# Patient Record
Sex: FEMALE | Race: WHITE | NOT HISPANIC OR LATINO | Employment: FULL TIME | URBAN - METROPOLITAN AREA
[De-identification: names, ages, dates, MRNs, and addresses within clinical notes are randomized per-mention and may not be internally consistent; named-entity substitution may affect disease eponyms.]

---

## 2017-05-05 ENCOUNTER — HOSPITAL ENCOUNTER (OUTPATIENT)
Dept: RADIOLOGY | Age: 22
Discharge: HOME | End: 2017-05-05
Payer: COMMERCIAL

## 2017-05-05 DIAGNOSIS — N83.209 OVARIAN CYST: ICD-10-CM

## 2017-05-05 PROCEDURE — 76830 TRANSVAGINAL US NON-OB: CPT

## 2017-05-05 PROCEDURE — 76856 US EXAM PELVIC COMPLETE: CPT

## 2017-05-09 ENCOUNTER — GENERIC CONVERSION - ENCOUNTER (OUTPATIENT)
Dept: OTHER | Facility: OTHER | Age: 22
End: 2017-05-09

## 2017-07-26 ENCOUNTER — APPOINTMENT (EMERGENCY)
Dept: ULTRASOUND IMAGING | Facility: HOSPITAL | Age: 22
End: 2017-07-26
Payer: COMMERCIAL

## 2017-07-26 ENCOUNTER — HOSPITAL ENCOUNTER (EMERGENCY)
Facility: HOSPITAL | Age: 22
Discharge: HOME | End: 2017-07-26
Attending: EMERGENCY MEDICINE | Admitting: EMERGENCY MEDICINE
Payer: COMMERCIAL

## 2017-07-26 VITALS
HEART RATE: 81 BPM | DIASTOLIC BLOOD PRESSURE: 64 MMHG | TEMPERATURE: 97.8 F | OXYGEN SATURATION: 98 % | WEIGHT: 139 LBS | SYSTOLIC BLOOD PRESSURE: 104 MMHG | RESPIRATION RATE: 18 BRPM

## 2017-07-26 DIAGNOSIS — R10.9 ABDOMINAL PAIN: Primary | ICD-10-CM

## 2017-07-26 LAB
ALBUMIN SERPL BCP-MCNC: 3.3 G/DL (ref 3.5–5)
ALP SERPL-CCNC: 73 U/L (ref 46–116)
ALT SERPL W P-5'-P-CCNC: 16 U/L (ref 12–78)
ANION GAP SERPL CALCULATED.3IONS-SCNC: 8 MMOL/L (ref 4–13)
AST SERPL W P-5'-P-CCNC: 14 U/L (ref 5–45)
BASOPHILS # BLD AUTO: 0.02 THOUSANDS/ΜL (ref 0–0.1)
BASOPHILS NFR BLD AUTO: 0 % (ref 0–1)
BILIRUB SERPL-MCNC: 0.3 MG/DL (ref 0.2–1)
BUN SERPL-MCNC: 11 MG/DL (ref 5–25)
CALCIUM SERPL-MCNC: 9.9 MG/DL (ref 8.3–10.1)
CHLORIDE SERPL-SCNC: 104 MMOL/L (ref 100–108)
CO2 SERPL-SCNC: 25 MMOL/L (ref 21–32)
CREAT SERPL-MCNC: 0.72 MG/DL (ref 0.6–1.3)
EOSINOPHIL # BLD AUTO: 0.1 THOUSAND/ΜL (ref 0–0.61)
EOSINOPHIL NFR BLD AUTO: 1 % (ref 0–6)
ERYTHROCYTE [DISTWIDTH] IN BLOOD BY AUTOMATED COUNT: 13.7 % (ref 11.6–15.1)
GFR SERPL CREATININE-BSD FRML MDRD: 121 ML/MIN/1.73SQ M
GLUCOSE SERPL-MCNC: 81 MG/DL (ref 65–140)
HCG UR QL: NEGATIVE
HCT VFR BLD AUTO: 43.1 % (ref 34.8–46.1)
HGB BLD-MCNC: 14 G/DL (ref 11.5–15.4)
LIPASE SERPL-CCNC: 177 U/L (ref 73–393)
LYMPHOCYTES # BLD AUTO: 2.69 THOUSANDS/ΜL (ref 0.6–4.47)
LYMPHOCYTES NFR BLD AUTO: 33 % (ref 14–44)
MCH RBC QN AUTO: 28.7 PG (ref 26.8–34.3)
MCHC RBC AUTO-ENTMCNC: 32.5 G/DL (ref 31.4–37.4)
MCV RBC AUTO: 89 FL (ref 82–98)
MONOCYTES # BLD AUTO: 0.54 THOUSAND/ΜL (ref 0.17–1.22)
MONOCYTES NFR BLD AUTO: 7 % (ref 4–12)
NEUTROPHILS # BLD AUTO: 4.83 THOUSANDS/ΜL (ref 1.85–7.62)
NEUTS SEG NFR BLD AUTO: 59 % (ref 43–75)
PLATELET # BLD AUTO: 327 THOUSANDS/UL (ref 149–390)
PMV BLD AUTO: 9.5 FL (ref 8.9–12.7)
POTASSIUM SERPL-SCNC: 4 MMOL/L (ref 3.5–5.3)
PROT SERPL-MCNC: 7.7 G/DL (ref 6.4–8.2)
RBC # BLD AUTO: 4.87 MILLION/UL (ref 3.81–5.12)
SODIUM SERPL-SCNC: 137 MMOL/L (ref 136–145)
WBC # BLD AUTO: 8.18 THOUSAND/UL (ref 4.31–10.16)

## 2017-07-26 PROCEDURE — 81025 URINE PREGNANCY TEST: CPT | Performed by: EMERGENCY MEDICINE

## 2017-07-26 PROCEDURE — 80053 COMPREHEN METABOLIC PANEL: CPT | Performed by: EMERGENCY MEDICINE

## 2017-07-26 PROCEDURE — 36415 COLL VENOUS BLD VENIPUNCTURE: CPT | Performed by: EMERGENCY MEDICINE

## 2017-07-26 PROCEDURE — 85025 COMPLETE CBC W/AUTO DIFF WBC: CPT | Performed by: EMERGENCY MEDICINE

## 2017-07-26 PROCEDURE — 83690 ASSAY OF LIPASE: CPT | Performed by: EMERGENCY MEDICINE

## 2017-07-26 PROCEDURE — 96374 THER/PROPH/DIAG INJ IV PUSH: CPT

## 2017-07-26 PROCEDURE — 76705 ECHO EXAM OF ABDOMEN: CPT

## 2017-07-26 PROCEDURE — 99284 EMERGENCY DEPT VISIT MOD MDM: CPT

## 2017-07-26 PROCEDURE — 96361 HYDRATE IV INFUSION ADD-ON: CPT

## 2017-07-26 PROCEDURE — 96375 TX/PRO/DX INJ NEW DRUG ADDON: CPT

## 2017-07-26 RX ORDER — ONDANSETRON 2 MG/ML
4 INJECTION INTRAMUSCULAR; INTRAVENOUS ONCE
Status: COMPLETED | OUTPATIENT
Start: 2017-07-26 | End: 2017-07-26

## 2017-07-26 RX ORDER — KETOROLAC TROMETHAMINE 30 MG/ML
15 INJECTION, SOLUTION INTRAMUSCULAR; INTRAVENOUS ONCE
Status: COMPLETED | OUTPATIENT
Start: 2017-07-26 | End: 2017-07-26

## 2017-07-26 RX ADMIN — FAMOTIDINE 20 MG: 10 INJECTION, SOLUTION INTRAVENOUS at 06:58

## 2017-07-26 RX ADMIN — ONDANSETRON 4 MG: 2 INJECTION INTRAMUSCULAR; INTRAVENOUS at 06:58

## 2017-07-26 RX ADMIN — SODIUM CHLORIDE 1000 ML: 0.9 INJECTION, SOLUTION INTRAVENOUS at 06:58

## 2017-07-26 RX ADMIN — KETOROLAC TROMETHAMINE 15 MG: 30 INJECTION, SOLUTION INTRAMUSCULAR at 06:58

## 2017-09-20 ENCOUNTER — GENERIC CONVERSION - ENCOUNTER (OUTPATIENT)
Dept: OTHER | Facility: OTHER | Age: 22
End: 2017-09-20

## 2017-09-20 PROCEDURE — G0145 SCR C/V CYTO,THINLAYER,RESCR: HCPCS | Performed by: NURSE PRACTITIONER

## 2017-09-21 ENCOUNTER — LAB REQUISITION (OUTPATIENT)
Dept: LAB | Facility: HOSPITAL | Age: 22
End: 2017-09-21
Payer: COMMERCIAL

## 2017-09-21 DIAGNOSIS — Z01.419 ENCOUNTER FOR GYNECOLOGICAL EXAMINATION WITHOUT ABNORMAL FINDING: ICD-10-CM

## 2017-09-21 DIAGNOSIS — Z11.3 ENCOUNTER FOR SCREENING FOR INFECTIONS WITH PREDOMINANTLY SEXUAL MODE OF TRANSMISSION: ICD-10-CM

## 2017-09-21 PROCEDURE — 87491 CHLMYD TRACH DNA AMP PROBE: CPT | Performed by: NURSE PRACTITIONER

## 2017-09-21 PROCEDURE — 87591 N.GONORRHOEAE DNA AMP PROB: CPT | Performed by: NURSE PRACTITIONER

## 2017-09-25 LAB
CHLAMYDIA DNA CVX QL NAA+PROBE: NORMAL
N GONORRHOEA DNA GENITAL QL NAA+PROBE: NORMAL

## 2017-10-05 LAB
LAB AP GYN PRIMARY INTERPRETATION: NORMAL
Lab: NORMAL

## 2018-01-17 NOTE — RESULT NOTES
Verified Results  * US PELVIS COMPLETE [facility] [place] AND TRANSVAGINAL) [id number] 10:22AM Savanna Kurtz Order Number: [id number]     Test Name Result Flag Reference   US PELVIS COMPLETE (TRANSABDOMINAL AND TRANSVAGINAL) (Report)     PELVIC ULTRASOUND, COMPLETE     INDICATION: Ovarian cyst           COMPARISON: None  TECHNIQUE:  Transabdominal pelvic ultrasound was performed in sagittal and transverse planes with a curvilinear transducer  Additional transvaginal imaging was performed to better evaluate the endometrium and ovaries  Imaging included volumetric    sweeps as well as traditional still imaging technique  FINDINGS:     UTERUS:   The uterus is anteverted in position, measuring 8 3 x 3 1 x 4 3 cm  Contour and echotexture appear normal      The cervix shows no suspicious abnormality  ENDOMETRIUM:    Normal caliber of 4 mm  Homogenous and normal in appearance  OVARIES/ADNEXA:   Right ovary: 2 4 x 2 0 x 2 0 cm  There is a tiny ill-defined echogenic in the ovary measuring 0 7 x 0 6 x 0 7 cm, possibly calcification  Doppler flow within normal limits  Left ovary: 4 8 x 2 3 x 3 7 cm  Large, well-circumscribed predominantly hyperechoic heterogeneous mass within the ovary measures 3 8 x 2 1 x 3 4 cm, with no intrinsic vascularity  A tiny calcification is noted within the mass  Findings may represent a dermoid cyst    Doppler flow within normal limits  No suspicious adnexal mass or loculated collections  There is no free fluid  IMPRESSION:      Complex 3 8 cm left ovarian mass, possibly a dermoid  Suggest MRI for further evaluation  Indeterminate subcentimeter hyperechoic focus in the right ovary  Follow-up in 6-12 weeks suggested to ensure stability or resolution            ##sigslh##sigslh            Workstation performed: [id number]     Signed by:   Naman Resendiz MD   5/8/17

## 2018-01-22 VITALS
WEIGHT: 136.19 LBS | DIASTOLIC BLOOD PRESSURE: 60 MMHG | SYSTOLIC BLOOD PRESSURE: 100 MMHG | BODY MASS INDEX: 24.67 KG/M2

## 2018-01-22 VITALS
SYSTOLIC BLOOD PRESSURE: 100 MMHG | DIASTOLIC BLOOD PRESSURE: 60 MMHG | WEIGHT: 136.19 LBS | BODY MASS INDEX: 24.67 KG/M2

## 2018-03-14 RX ORDER — DROSPIRENONE, ETHINYL ESTRADIOL AND LEVOMEFOLATE CALCIUM AND LEVOMEFOLATE CALCIUM 3-0.02(24)
KIT ORAL
Qty: 84 TABLET | Refills: 0 | OUTPATIENT
Start: 2018-03-14

## 2018-03-19 DIAGNOSIS — Z30.41 ENCOUNTER FOR SURVEILLANCE OF CONTRACEPTIVE PILLS: Primary | ICD-10-CM

## 2018-03-19 RX ORDER — DROSPIRENONE, ETHINYL ESTRADIOL AND LEVOMEFOLATE CALCIUM AND LEVOMEFOLATE CALCIUM 3-0.02(24)
1 KIT ORAL DAILY
Qty: 90 TABLET | Refills: 2 | Status: SHIPPED | OUTPATIENT
Start: 2018-03-19 | End: 2018-10-19 | Stop reason: SDUPTHER

## 2018-03-19 RX ORDER — DROSPIRENONE, ETHINYL ESTRADIOL AND LEVOMEFOLATE CALCIUM AND LEVOMEFOLATE CALCIUM 3-0.02(24)
1 KIT ORAL DAILY
COMMUNITY
Start: 2014-07-21 | End: 2018-03-19 | Stop reason: SDUPTHER

## 2018-03-19 NOTE — TELEPHONE ENCOUNTER
The patient called last Wednesday and she has now been without pills for about 3-4 days because the script was not filled

## 2018-10-19 DIAGNOSIS — Z30.41 ENCOUNTER FOR SURVEILLANCE OF CONTRACEPTIVE PILLS: ICD-10-CM

## 2018-10-19 RX ORDER — DROSPIRENONE, ETHINYL ESTRADIOL AND LEVOMEFOLATE CALCIUM AND LEVOMEFOLATE CALCIUM 3-0.02(24)
1 KIT ORAL DAILY
Qty: 28 TABLET | Refills: 0 | Status: SHIPPED | OUTPATIENT
Start: 2018-10-19 | End: 2018-10-23 | Stop reason: SDUPTHER

## 2018-10-23 ENCOUNTER — ANNUAL EXAM (OUTPATIENT)
Dept: OBGYN CLINIC | Facility: MEDICAL CENTER | Age: 23
End: 2018-10-23
Payer: COMMERCIAL

## 2018-10-23 VITALS
WEIGHT: 137 LBS | SYSTOLIC BLOOD PRESSURE: 110 MMHG | DIASTOLIC BLOOD PRESSURE: 80 MMHG | HEIGHT: 63 IN | BODY MASS INDEX: 24.27 KG/M2

## 2018-10-23 DIAGNOSIS — Z01.419 ENCNTR FOR GYN EXAM (GENERAL) (ROUTINE) W/O ABN FINDINGS: Primary | ICD-10-CM

## 2018-10-23 DIAGNOSIS — N83.8 OVARIAN MASS, LEFT: ICD-10-CM

## 2018-10-23 DIAGNOSIS — Z30.41 ENCOUNTER FOR SURVEILLANCE OF CONTRACEPTIVE PILLS: ICD-10-CM

## 2018-10-23 PROCEDURE — S0612 ANNUAL GYNECOLOGICAL EXAMINA: HCPCS | Performed by: NURSE PRACTITIONER

## 2018-10-23 RX ORDER — DROSPIRENONE, ETHINYL ESTRADIOL AND LEVOMEFOLATE CALCIUM AND LEVOMEFOLATE CALCIUM 3-0.02(24)
1 KIT ORAL DAILY
Qty: 84 TABLET | Refills: 3 | Status: SHIPPED | OUTPATIENT
Start: 2018-10-23 | End: 2019-11-07 | Stop reason: SDUPTHER

## 2018-10-23 NOTE — PROGRESS NOTES
ASSESSMENT & PLAN: Lyndsay Peralta is a 26 y o  Jael Sarahy with normal gynecologic exam     1   Routine well woman exam done today  2  Pap and HPV:  The patient's last pap was 2017  It was normal     Pap was not done today  Current ASCCP Guidelines reviewed  3   STD testing  was not done   4  Gardasil recommendations reviewed  is vaccinated  5  The following were reviewed in today's visit: breast self exam, use and side effects of OCPs, adequate intake of calcium and vitamin D, exercise and healthy diet  6  Sent for f/u us to check stability of possible dermoid cyst 2016  CC:  Annual Gynecologic Examination    HPI: Lyndsay Peralta is a 26 y o  Jael Sarahy who presents for annual gynecologic examination  In monog relationship for 2 years  Neg g/c last year,declines repeat  She has the following concerns: requests repeat us to check ovary, asymtomatic  Health Maintenance:    She wears her seatbelt routinely  She does not perform regular monthly self breast exams  She feels safe at home  Past Medical History:   Diagnosis Date    Ovarian cyst        History reviewed  No pertinent surgical history  OB/Gyn History:    Pt does not have menstrual issues  History of sexually transmitted infection: No   History of abnormal pap smears: No      Patient is currently sexually active  The current method of family planning is OCP (estrogen/progesterone)  OB History      Para Term  AB Living    0 0 0 0 0 0    SAB TAB Ectopic Multiple Live Births    0 0 0 0 0          Family History   Problem Relation Age of Onset    No Known Problems Mother     No Known Problems Father        Social History:  Social History     Social History    Marital status: Single     Spouse name: N/A    Number of children: N/A    Years of education: N/A     Occupational History    Not on file       Social History Main Topics    Smoking status: Never Smoker    Smokeless tobacco: Never Used   Mavis Cousin Alcohol use Yes      Comment: social    Drug use: No    Sexual activity: Yes     Partners: Male     Birth control/ protection: Pill     Other Topics Concern    Not on file     Social History Narrative    No narrative on file     Patient is single  Patient is currently employed , has biology degree, working in [employer]  Allergies   Allergen Reactions    Amoxicillin GI Intolerance         Current Outpatient Prescriptions:     Drospiren-Eth Estrad-Levomefol 3-0 02-0 451 MG TABS, Take 1 tablet by mouth daily, Disp: 84 tablet, Rfl: 3    Review of Systems:  Constitutional :no fever, feels well, no tiredness, no recent weight gain or loss  ENT: no ear ache, no loss of hearing, no nosebleeds or nasal discharge, no sore throat or hoarseness  Cardiovascular: no complaints of slow or fast heart beat, no chest pain, no palpitations, no leg claudication or lower extremity edema  Respiratory: no complaints of shortness of shortness of breath, no RAMOS  Breasts:no complaints of breast pain, breast lump, or nipple discharge  Gastrointestinal: no complaints of abdominal pain, constipation, nausea, vomiting, or diarrhea or bloody stools  Genitourinary : no complaints of dysuria, incontinence, pelvic pain, no dysmenorrhea, vaginal discharge or abnormal vaginal bleeding and as noted in HPI  Musculoskeletal: no complaints of arthralgia, no myalgia, no joint swelling or stiffness, no limb pain or swelling    Integumentary: no complaints of skin rash or lesion, itching or dry skin  Neurological: no complaints of headache, no confusion, no numbness or tingling, no dizziness or fainting    Objective      /80   Ht 5' 2 5" (1 588 m)   Wt 62 1 kg (137 lb)   LMP 09/25/2018   BMI 24 66 kg/m²     General:   appears stated age, cooperative, alert normal mood and affect   Neck: normal, supple,trachea midline, no masses   Heart: regular rate and rhythm, S1, S2 normal, no murmur, click, rub or gallop   Lungs: clear to auscultation bilaterally   Breasts: normal appearance, no masses or tenderness   Abdomen: soft, non-tender, without masses or organomegaly   Vulva: normal female genitalia   Vagina: normal vagina   Urethra: normal   Cervix: Normal, no discharge  Uterus: normal size, contour, position, consistency, mobility, non-tender   Adnexa: normal adnexa   Lymphatic palpation of lymph nodes in neck, axilla, groin and/or other locations: no lymphadenopathy or masses noted   Skin normal skin turgor and no rashes     Psychiatric orientation to person, place, and time: normal  mood and affect: normal

## 2019-01-02 ENCOUNTER — APPOINTMENT (EMERGENCY)
Dept: ULTRASOUND IMAGING | Facility: HOSPITAL | Age: 24
End: 2019-01-02
Payer: COMMERCIAL

## 2019-01-02 ENCOUNTER — APPOINTMENT (EMERGENCY)
Dept: RADIOLOGY | Facility: HOSPITAL | Age: 24
End: 2019-01-02
Payer: COMMERCIAL

## 2019-01-02 ENCOUNTER — HOSPITAL ENCOUNTER (EMERGENCY)
Facility: HOSPITAL | Age: 24
Discharge: HOME | End: 2019-01-02
Attending: EMERGENCY MEDICINE | Admitting: EMERGENCY MEDICINE
Payer: COMMERCIAL

## 2019-01-02 VITALS
OXYGEN SATURATION: 97 % | BODY MASS INDEX: 24.6 KG/M2 | WEIGHT: 136.69 LBS | SYSTOLIC BLOOD PRESSURE: 108 MMHG | HEART RATE: 76 BPM | DIASTOLIC BLOOD PRESSURE: 71 MMHG | TEMPERATURE: 98.1 F | RESPIRATION RATE: 14 BRPM

## 2019-01-02 DIAGNOSIS — B34.9 VIRAL SYNDROME: ICD-10-CM

## 2019-01-02 DIAGNOSIS — R74.01 TRANSAMINITIS: ICD-10-CM

## 2019-01-02 DIAGNOSIS — E86.9 VOLUME DEPLETION: ICD-10-CM

## 2019-01-02 DIAGNOSIS — K59.00 CONSTIPATION: Primary | ICD-10-CM

## 2019-01-02 LAB
ALBUMIN SERPL BCP-MCNC: 3.1 G/DL (ref 3.5–5)
ALP SERPL-CCNC: 372 U/L (ref 46–116)
ALT SERPL W P-5'-P-CCNC: 258 U/L (ref 12–78)
ANION GAP SERPL CALCULATED.3IONS-SCNC: 9 MMOL/L (ref 4–13)
ANISOCYTOSIS BLD QL SMEAR: PRESENT
AST SERPL W P-5'-P-CCNC: 336 U/L (ref 5–45)
BACTERIA UR QL AUTO: ABNORMAL /HPF
BASOPHILS # BLD MANUAL: 0 THOUSAND/UL (ref 0–0.1)
BASOPHILS NFR MAR MANUAL: 0 % (ref 0–1)
BILIRUB SERPL-MCNC: 1 MG/DL (ref 0.2–1)
BILIRUB UR QL STRIP: ABNORMAL
BUN SERPL-MCNC: 8 MG/DL (ref 5–25)
CALCIUM SERPL-MCNC: 9.2 MG/DL (ref 8.3–10.1)
CAOX CRY URNS QL MICRO: ABNORMAL /HPF
CHLORIDE SERPL-SCNC: 105 MMOL/L (ref 100–108)
CLARITY UR: CLEAR
CO2 SERPL-SCNC: 26 MMOL/L (ref 21–32)
COLOR UR: ABNORMAL
CREAT SERPL-MCNC: 0.89 MG/DL (ref 0.6–1.3)
EOSINOPHIL # BLD MANUAL: 0 THOUSAND/UL (ref 0–0.4)
EOSINOPHIL NFR BLD MANUAL: 0 % (ref 0–6)
ERYTHROCYTE [DISTWIDTH] IN BLOOD BY AUTOMATED COUNT: 13.7 % (ref 11.6–15.1)
EXT PREG TEST URINE: NEGATIVE
FLUAV AG SPEC QL IA: NEGATIVE
FLUAV AG SPEC QL: NORMAL
FLUBV AG SPEC QL IA: NEGATIVE
FLUBV AG SPEC QL: NORMAL
GFR SERPL CREATININE-BSD FRML MDRD: 92 ML/MIN/1.73SQ M
GIANT PLATELETS BLD QL SMEAR: PRESENT
GLUCOSE SERPL-MCNC: 91 MG/DL (ref 65–140)
GLUCOSE UR STRIP-MCNC: NEGATIVE MG/DL
HCT VFR BLD AUTO: 43.3 % (ref 34.8–46.1)
HETEROPH AB SER QL: POSITIVE
HGB BLD-MCNC: 13.8 G/DL (ref 11.5–15.4)
HGB UR QL STRIP.AUTO: NEGATIVE
KETONES UR STRIP-MCNC: ABNORMAL MG/DL
LEUKOCYTE ESTERASE UR QL STRIP: ABNORMAL
LG PLATELETS BLD QL SMEAR: PRESENT
LYMPHOCYTES # BLD AUTO: 11.42 THOUSAND/UL (ref 0.6–4.47)
LYMPHOCYTES # BLD AUTO: 73 % (ref 14–44)
MCH RBC QN AUTO: 28.8 PG (ref 26.8–34.3)
MCHC RBC AUTO-ENTMCNC: 31.9 G/DL (ref 31.4–37.4)
MCV RBC AUTO: 90 FL (ref 82–98)
MONOCYTES # BLD AUTO: 1.57 THOUSAND/UL (ref 0–1.22)
MONOCYTES NFR BLD: 10 % (ref 4–12)
MUCOUS THREADS UR QL AUTO: ABNORMAL
NEUTROPHILS # BLD MANUAL: 1.41 THOUSAND/UL (ref 1.85–7.62)
NEUTS SEG NFR BLD AUTO: 9 % (ref 43–75)
NITRITE UR QL STRIP: NEGATIVE
NON-SQ EPI CELLS URNS QL MICRO: ABNORMAL /HPF
NRBC BLD AUTO-RTO: 0 /100 WBCS
PH UR STRIP.AUTO: 6 [PH] (ref 4.5–8)
PLATELET # BLD AUTO: 156 THOUSANDS/UL (ref 149–390)
PLATELET BLD QL SMEAR: ADEQUATE
PMV BLD AUTO: 9.2 FL (ref 8.9–12.7)
POTASSIUM SERPL-SCNC: 4.2 MMOL/L (ref 3.5–5.3)
PROT SERPL-MCNC: 7.3 G/DL (ref 6.4–8.2)
PROT UR STRIP-MCNC: NEGATIVE MG/DL
RBC # BLD AUTO: 4.79 MILLION/UL (ref 3.81–5.12)
RBC #/AREA URNS AUTO: ABNORMAL /HPF
RSV B RNA SPEC QL NAA+PROBE: NORMAL
SODIUM SERPL-SCNC: 140 MMOL/L (ref 136–145)
SP GR UR STRIP.AUTO: >=1.03 (ref 1–1.03)
TOTAL CELLS COUNTED SPEC: 100
UROBILINOGEN UR QL STRIP.AUTO: 1 E.U./DL
VARIANT LYMPHS # BLD AUTO: 8 %
WBC # BLD AUTO: 15.65 THOUSAND/UL (ref 4.31–10.16)
WBC #/AREA URNS AUTO: ABNORMAL /HPF

## 2019-01-02 PROCEDURE — 36415 COLL VENOUS BLD VENIPUNCTURE: CPT | Performed by: EMERGENCY MEDICINE

## 2019-01-02 PROCEDURE — 81001 URINALYSIS AUTO W/SCOPE: CPT | Performed by: EMERGENCY MEDICINE

## 2019-01-02 PROCEDURE — 74022 RADEX COMPL AQT ABD SERIES: CPT

## 2019-01-02 PROCEDURE — 81025 URINE PREGNANCY TEST: CPT | Performed by: EMERGENCY MEDICINE

## 2019-01-02 PROCEDURE — 87631 RESP VIRUS 3-5 TARGETS: CPT | Performed by: EMERGENCY MEDICINE

## 2019-01-02 PROCEDURE — 76705 ECHO EXAM OF ABDOMEN: CPT

## 2019-01-02 PROCEDURE — 99284 EMERGENCY DEPT VISIT MOD MDM: CPT

## 2019-01-02 PROCEDURE — 86308 HETEROPHILE ANTIBODY SCREEN: CPT | Performed by: EMERGENCY MEDICINE

## 2019-01-02 PROCEDURE — 85027 COMPLETE CBC AUTOMATED: CPT | Performed by: EMERGENCY MEDICINE

## 2019-01-02 PROCEDURE — 80053 COMPREHEN METABOLIC PANEL: CPT | Performed by: EMERGENCY MEDICINE

## 2019-01-02 PROCEDURE — 85007 BL SMEAR W/DIFF WBC COUNT: CPT | Performed by: EMERGENCY MEDICINE

## 2019-01-02 RX ORDER — POLYETHYLENE GLYCOL 3350 17 G/17G
17 POWDER, FOR SOLUTION ORAL DAILY
Qty: 51 G | Refills: 0 | Status: SHIPPED | OUTPATIENT
Start: 2019-01-02 | End: 2019-04-18 | Stop reason: ALTCHOICE

## 2019-01-02 NOTE — ED PROVIDER NOTES
History  Chief Complaint   Patient presents with    Abdominal Pain     Pt states that she has been feeling bloated and having abdominal pain for 5 days  Pt states that she also has a lump in her right groin area  Patient is a 56-year-old female with no past medical history presents with abdominal pain and bloating  Patient states that pain started a few days ago and has been progressively worse  She describes the pain as generalized discomfort with a feeling of bloating  She describes nausea and 1 episode of vomiting yesterday  She has had decreased appetite  She denies diarrhea but admits to constipation  She has had 1 bowel movement in the last 5 days  She took Dulcolax prior to this bowel movement on Saturday but has not moved her bowels since then  She denies any vaginal bleeding or discharge and her last menstrual period was 2 weeks ago  History provided by:  Patient  Abdominal Pain   Pain location:  Generalized  Pain quality: aching and bloating    Pain radiates to:  Does not radiate  Pain severity:  Mild  Duration:  5 days  Progression:  Unchanged  Associated symptoms: constipation    Associated symptoms: no chest pain, no chills, no cough, no diarrhea, no dysuria, no fever, no hematochezia, no hematuria, no nausea, no shortness of breath, no sore throat, no vaginal bleeding, no vaginal discharge and no vomiting        Prior to Admission Medications   Prescriptions Last Dose Informant Patient Reported? Taking? Drospiren-Eth Estrad-Levomefol 3-0 02-0 451 MG TABS   No No   Sig: Take 1 tablet by mouth daily      Facility-Administered Medications: None       Past Medical History:   Diagnosis Date    Ovarian cyst        History reviewed  No pertinent surgical history  Family History   Problem Relation Age of Onset    No Known Problems Mother     No Known Problems Father      I have reviewed and agree with the history as documented      Social History   Substance Use Topics    Smoking status: Never Smoker    Smokeless tobacco: Never Used    Alcohol use Yes      Comment: social - 1 times a week  Review of Systems   Constitutional: Negative for chills and fever  HENT: Negative for sore throat and trouble swallowing  Eyes: Negative for visual disturbance  Respiratory: Negative for cough and shortness of breath  Cardiovascular: Negative for chest pain, palpitations and leg swelling  Gastrointestinal: Positive for abdominal pain and constipation  Negative for diarrhea, hematochezia, nausea and vomiting  Genitourinary: Negative for dysuria, hematuria, vaginal bleeding and vaginal discharge  Musculoskeletal: Negative for neck pain and neck stiffness  Neurological: Negative for dizziness, weakness, numbness and headaches  Physical Exam  Physical Exam   Constitutional: She is oriented to person, place, and time  She appears well-developed and well-nourished  HENT:   Head: Atraumatic  Eyes: Pupils are equal, round, and reactive to light  EOM are normal    Neck: Normal range of motion  Neck supple  Cardiovascular: Normal rate, regular rhythm, normal heart sounds, intact distal pulses and normal pulses  Pulmonary/Chest: Effort normal and breath sounds normal  No respiratory distress  Abdominal: Soft  She exhibits no distension  There is generalized tenderness  There is no rigidity, no rebound and no guarding  Mild tenderness in the right inguinal region  No palpable hernia, lymphadenopathy  No overlying skin changes  Musculoskeletal: Normal range of motion  She exhibits no edema or tenderness  Neurological: She is alert and oriented to person, place, and time  She has normal strength  No cranial nerve deficit or sensory deficit  Skin: Skin is warm and dry  Psychiatric: She has a normal mood and affect         Vital Signs  ED Triage Vitals [01/02/19 0805]   Temperature Pulse Respirations Blood Pressure SpO2   98 1 °F (36 7 °C) 91 14 125/67 98 %      Temp Source Heart Rate Source Patient Position - Orthostatic VS BP Location FiO2 (%)   Oral Monitor Lying Right arm --      Pain Score       6           Vitals:    01/02/19 0805 01/02/19 1031   BP: 125/67 108/71   Pulse: 91 76   Patient Position - Orthostatic VS: Lying Sitting       Visual Acuity      ED Medications  Medications - No data to display    Diagnostic Studies  Results Reviewed     Procedure Component Value Units Date/Time    INFLUENZA A/B AND RSV, PCR [[id number]]  (Normal) Collected:  01/02/19 0912    Lab Status:  Final result Specimen:  Nasopharyngeal from Nasopharyngeal Swab Updated:  01/02/19 1533     INFLU A PCR None Detected     INFLU B PCR None Detected     RSV PCR None Detected    Mononucleosis screen [[id number]]  (Abnormal) Collected:  01/02/19 0930    Lab Status:  Final result Specimen:  Blood from Arm, Right Updated:  01/02/19 1505     Monotest Positive (A)    Urine Microscopic [[id number]]  (Abnormal) Collected:  01/02/19 1100    Lab Status:  Final result Specimen:  Urine from Urine, Clean Catch Updated:  01/02/19 1321     RBC, UA 0-1 (A) /hpf      WBC, UA 2-4 (A) /hpf      Epithelial Cells Occasional /hpf      Bacteria, UA Occasional /hpf      Ca Oxalate Leanne, UA Occasional (A) /hpf      MUCUS THREADS Occasional (A)    UA w Reflex to Microscopic [[id number]]  (Abnormal) Collected:  01/02/19 1100    Lab Status:  Final result Specimen:  Urine from Urine, Clean Catch Updated:  01/02/19 1119     Color, UA Liane     Clarity, UA Clear     Specific Gravity, UA >=1 030     pH, UA 6 0     Leukocytes, UA Trace (A)     Nitrite, UA Negative     Protein, UA Negative mg/dl      Glucose, UA Negative mg/dl      Ketones, UA Trace (A) mg/dl      Urobilinogen, UA 1 0 E U /dl      Bilirubin, UA Moderate (A)     Blood, UA Negative    POCT pregnancy, urine [[id number]]  (Normal) Resulted:  01/02/19 1104    Lab Status:  Final result Specimen:  Urine Updated:  01/02/19 1104     EXT PREG TEST UR (Ref: Negative) negative    Rapid Influenza Screen with Reflex PCR [[id number]]  (Normal) Collected:  01/02/19 0912    Lab Status:  Final result Specimen:  Nasopharyngeal from Nasopharyngeal Swab Updated:  01/02/19 0936     Rapid Influenza A Ag Negative     Rapid Influenza B Ag Negative    CBC and differential [[id number]]  (Abnormal) Collected:  01/02/19 0838    Lab Status:  Final result Specimen:  Blood from Arm, Right Updated:  01/02/19 0920     WBC 15 65 (H) Thousand/uL      RBC 4 79 Million/uL      Hemoglobin 13 8 g/dL      Hematocrit 43 3 %      MCV 90 fL      MCH 28 8 pg      MCHC 31 9 g/dL      RDW 13 7 %      MPV 9 2 fL      Platelets 479 Thousands/uL      nRBC 0 /100 WBCs     Narrative: This is an appended report  These results have been appended to a previously verified report  Comprehensive metabolic panel [[id number]]  (Abnormal) Collected:  01/02/19 0838    Lab Status:  Final result Specimen:  Blood from Arm, Right Updated:  01/02/19 0905     Sodium 140 mmol/L      Potassium 4 2 mmol/L      Chloride 105 mmol/L      CO2 26 mmol/L      ANION GAP 9 mmol/L      BUN 8 mg/dL      Creatinine 0 89 mg/dL      Glucose 91 mg/dL      Calcium 9 2 mg/dL       (H) U/L       (H) U/L      Alkaline Phosphatase 372 (H) U/L      Total Protein 7 3 g/dL      Albumin 3 1 (L) g/dL      Total Bilirubin 1 00 mg/dL      eGFR 92 ml/min/1 73sq m     Narrative:         National Kidney Disease Education Program recommendations are as follows:  GFR calculation is accurate only with a steady state creatinine  Chronic Kidney disease less than 60 ml/min/1 73 sq  meters  Kidney failure less than 15 ml/min/1 73 sq  meters  US gallbladder   Final Result by Lori Russo DO (01/02 1031)      Normal       Workstation performed: [id number]         XR abdomen obstruction series   Final Result by William Moses MD (01/02 9173)      Moderate amount of stool without evidence of mechanical obstruction        Indeterminate calcifications in the midline of the pelvic area  Workstation performed: [id number]                    Procedures  Procedures       Phone Contacts  ED Phone Contact    ED Course  ED Course as of Jan 02 2019 Wed Jan 02, 2019   0907 Elevated transaminases  Will check influenza screen and ultrasound right upper quadrant  1101 sending urine studies now  Spoke with lab who states that mononucleosis screen is run once a day and may not result today  I discussed this with patient  1200 Patient well appearing  Her urine appears concentrated  However she is able to tolerate po and agrees to continue po hydration at home  MDM  Number of Diagnoses or Management Options  Constipation: new and does not require workup  Transaminitis: new and requires workup  Viral syndrome: new and requires workup  Volume depletion: new and does not require workup  Diagnosis management comments: Patient presents with nausea, abdominal discomfort, bloating and constipation  X-ray shows no evidence of small-bowel obstruction but does show moderate stool burden  Her labs indicate volume depletion and elevated transaminases  She is negative for influenza  I added a mononucleosis screen due to atypical lymphocytes  This is pending at time of discharge  However patient is well-appearing and is stable for discharge home  She will continue p o  Hydration at home I recommended MiraLax for constipation  She will follow up with primary care    Will return to ED sooner if symptoms worsen or persist        Amount and/or Complexity of Data Reviewed  Clinical lab tests: ordered and reviewed  Tests in the radiology section of CPT®: ordered and reviewed  Tests in the medicine section of CPT®: ordered and reviewed  Review and summarize past medical records: yes  Independent visualization of images, tracings, or specimens: yes    Risk of Complications, Morbidity, and/or Mortality  Presenting problems: moderate  Diagnostic procedures: low  Management options: low    Patient Progress  Patient progress: stable    CritCare Time    Disposition  Final diagnoses:   Constipation   Viral syndrome   Transaminitis   Volume depletion     Time reflects when diagnosis was documented in both MDM as applicable and the Disposition within this note     Time User Action Codes Description Comment    1/2/2019 11:33 AM Franklyn Flakes L Add [K59 00] Constipation     1/2/2019 11:33 AM Wells Flakes L Add [B34 9] Viral syndrome     1/2/2019 11:33 AM Franklyn Flakes L Add [R74 0] Transaminitis     1/2/2019 11:34 AM Eladia Lopez Add [E86 9] Volume depletion       ED Disposition     ED Disposition Condition Comment    Discharge  [facility] discharge to home/self care  Condition at discharge: Stable        Follow-up Information     Follow up With Specialties Details Why Contact Leila May    [phone]            Discharge Medication List as of 1/2/2019 11:36 AM      CONTINUE these medications which have NOT CHANGED    Details   Drospiren-Eth Estrad-Levomefol 3-0 02-0 451 MG TABS Take 1 tablet by mouth daily, Starting Tue 10/23/2018, Normal           No discharge procedures on file      ED Provider  Electronically Signed by           Liudmila Anthony DO  01/02/19 2019

## 2019-01-02 NOTE — DISCHARGE INSTRUCTIONS
Constipation   WHAT YOU NEED TO KNOW:   Constipation is when you have hard, dry bowel movements, or you go longer than usual between bowel movements  DISCHARGE INSTRUCTIONS:   Return to the emergency department if:   · You have blood in your bowel movements  · You have a fever and abdominal pain with the constipation  Contact your healthcare provider if:   · Your constipation gets worse  · You start to vomit  · You have questions or concerns about your condition or care  Medicines:   · Medicine or a fiber supplement  may help make your bowel movement softer  A laxative may help relax and loosen your intestines to help you have a bowel movement  You may also be given medicine to increase fluid in your intestines  The fluid may help move bowel movements through your intestines  · Take your medicine as directed  Contact your healthcare provider if you think your medicine is not helping or if you have side effects  Tell him of her if you are allergic to any medicine  Keep a list of the medicines, vitamins, and herbs you take  Include the amounts, and when and why you take them  Bring the list or the pill bottles to follow-up visits  Carry your medicine list with you in case of an emergency  Manage your constipation:   · Drink liquids as directed  You may need to drink extra liquids to help soften and move your bowels  Ask how much liquid to drink each day and which liquids are best for you  · Eat high-fiber foods  This may help decrease constipation by adding bulk to your bowel movements  High-fiber foods include fruit, vegetables, whole-grain breads and cereals, and beans  Your healthcare provider or dietitian can help you create a high-fiber meal plan  · Exercise regularly  Regular physical activity can help stimulate your intestines  Ask which exercises are best for you  · Schedule a time each day to have a bowel movement    This may help train your body to have regular bowel movements  Bend forward while you are on the toilet to help move the bowel movement out  Sit on the toilet for at least 10 minutes, even if you do not have a bowel movement  Follow up with your healthcare provider as directed:  Write down your questions so you remember to ask them during your visits  © 2017 [employer] Information is for End User's use only and may not be sold, redistributed or otherwise used for commercial purposes  All illustrations and images included in CareNotes® are the copyrighted property of A D [employer] M , Inc  or Jason Rapp  The above information is an  only  It is not intended as medical advice for individual conditions or treatments  Talk to your doctor, nurse or pharmacist before following any medical regimen to see if it is safe and effective for you  Viral Syndrome   WHAT YOU NEED TO KNOW:   Viral syndrome is a term used for a viral infection that has no clear cause  Viruses are spread easily from person to person through the air and on shared items  DISCHARGE INSTRUCTIONS:   Call 911 for the following:   · You have a seizure  · You cannot be woken  · You have chest pain or trouble breathing  Return to the emergency department if:   · You have a stiff neck, a bad headache, and sensitivity to light  · You feel weak, dizzy, or confused  · You stop urinating or urinate a lot less than normal      · You cough up blood or thick, yellow or green, mucus  · You have severe abdominal pain or your abdomen is larger than usual   Contact your healthcare provider if:   · Your symptoms do not get better with treatment, or get worse, after 3 days  · You have a rash or ear pain  · You have burning when you urinate  · You have questions or concerns about your condition or care  Medicines: You may  need any of the following:  · Acetaminophen  decreases pain and fever  It is available without a doctor's order   Ask how much medicine to take and how often to take it  Follow directions  Acetaminophen can cause liver damage if not taken correctly  · NSAIDs , such as ibuprofen, help decrease swelling, pain, and fever  NSAIDs can cause stomach bleeding or kidney problems in certain people  If you take blood thinner medicine, always ask your healthcare provider if NSAIDs are safe for you  Always read the medicine label and follow directions  · Cold medicine  helps decrease swelling, control a cough, and relieve chest or nasal congestion  · Saline nasal spray  helps decrease nasal congestion  · Take your medicine as directed  Contact your healthcare provider if you think your medicine is not helping or if you have side effects  Tell him of her if you are allergic to any medicine  Keep a list of the medicines, vitamins, and herbs you take  Include the amounts, and when and why you take them  Bring the list or the pill bottles to follow-up visits  Carry your medicine list with you in case of an emergency  Manage your symptoms:   · Drink liquids as directed  to prevent dehydration  Ask how much liquid to drink each day and which liquids are best for you  Ask if you should drink an oral rehydration solution (ORS)  An ORS has the right amounts of water, salts, and sugar you need to replace body fluids  This may help prevent dehydration caused by vomiting or diarrhea  Do not drink liquids with caffeine  Drinks with caffeine can make dehydration worse  · Get plenty of rest  to help your body heal  Take naps throughout the day  Ask your healthcare provider when you can return to work and your normal activities  · Use a cool mist humidifier  to help you breathe easier if you have nasal or chest congestion  Ask your healthcare provider how to use a cool mist humidifier  · Eat honey or use cough drops  to help decrease throat discomfort  Ask your healthcare provider how much honey you should eat each day   Cough drops are available without a doctor's order  Follow directions for taking cough drops  · Do not smoke and stay away from others who smoke  Nicotine and other chemicals in cigarettes and cigars can cause lung damage  Smoking can also delay healing  Ask your healthcare provider for information if you currently smoke and need help to quit  E-cigarettes or smokeless tobacco still contain nicotine  Talk to your healthcare provider before you use these products  · Wash your hands frequently  to prevent the spread of germs to others  Use soap and water  Use gel hand  when soap and water are not available  Wash your hands after you use the bathroom, cough, or sneeze  Wash your hands before you prepare or eat food  Follow up with your healthcare provider as directed:  Write down your questions so you remember to ask them during your visits  © 2017 [employer] Information is for End User's use only and may not be sold, redistributed or otherwise used for commercial purposes  All illustrations and images included in CareNotes® are the copyrighted property of [employer]  or Jason Rapp  The above information is an  only  It is not intended as medical advice for individual conditions or treatments  Talk to your doctor, nurse or pharmacist before following any medical regimen to see if it is safe and effective for you

## 2019-01-03 NOTE — PROGRESS NOTES
I attempted to notify the patient with her positive Monospot results  Mailbox was full  I was unable to leave a message  I will attempt again tomorrow

## 2019-01-04 NOTE — PROGRESS NOTES
I attempted to call the patient with her positive Monospot results again  Her mailbox was full and I was unable to leave a message  I will send a letter

## 2019-01-15 NOTE — ED NOTES
Called patient regarding positive monospot test  Recommended patient to refrain from contact sports   Follow up with KARINA Bonilla PA-C  01/15/19 [id number]

## 2019-02-15 ENCOUNTER — OFFICE VISIT (OUTPATIENT)
Dept: FAMILY MEDICINE CLINIC | Facility: CLINIC | Age: 24
End: 2019-02-15
Payer: COMMERCIAL

## 2019-02-15 VITALS
HEIGHT: 61 IN | WEIGHT: 139 LBS | HEART RATE: 65 BPM | SYSTOLIC BLOOD PRESSURE: 110 MMHG | DIASTOLIC BLOOD PRESSURE: 77 MMHG | TEMPERATURE: 98.3 F | OXYGEN SATURATION: 98 % | BODY MASS INDEX: 26.24 KG/M2 | RESPIRATION RATE: 18 BRPM

## 2019-02-15 DIAGNOSIS — R19.8 ABNORMAL BOWEL MOVEMENT: ICD-10-CM

## 2019-02-15 DIAGNOSIS — D27.1 DERMOID CYST OF LEFT OVARY: ICD-10-CM

## 2019-02-15 DIAGNOSIS — Z00.00 ANNUAL PHYSICAL EXAM: Primary | ICD-10-CM

## 2019-02-15 PROCEDURE — 99385 PREV VISIT NEW AGE 18-39: CPT | Performed by: FAMILY MEDICINE

## 2019-02-15 NOTE — PROGRESS NOTES
ADULT ANNUAL PHYSICAL  [facility]    NAME: Ceci Duggan  AGE: 26 y o  SEX: female  : 1995     DATE: 2/15/2019     Assessment and Plan:     Problem List Items Addressed This Visit     None      Visit Diagnoses     Annual physical exam    -  Primary    Abnormal bowel movement        Dermoid cyst of left ovary        Relevant Orders    US pelvis complete w transvaginal          Health maintenance and preventative care screenings were discussed with patient today  Appropriate education was printed on patient's after visit summary  · Discussed risks/benefits of screening for cervical cancer and chlamydia/gonorrhea  Patient is up-to-date with their preventive screenings  · Immunizations were reviewed: patient is up-to-date with her immunizations  Counseling:  Dental Health: discussed importance of regular tooth brushing, flossing, and dental visits  BMI Counseling: Body mass index is 25 91 kg/m²  Discussed the patient's BMI with her  The BMI is in the acceptable range  · Alcohol/drug use: discussed moderation in alcohol intake and avoidance of illicit drug use  Return in about 1 year (around 2/15/2020) for Annual physical      Chief Complaint:     Chief Complaint   Patient presents with    new patient     IBS flare up x years      History of Present Illness:     Adult Annual Physical   Patient here for a comprehensive physical exam  The patient reports stomach issues for many years- alternating diarrhea and constipation, bloating at times  Fibers helped at times     Diet and Physical Activity  · Diet/Nutrition: well balanced diet and consuming 3-5 servings of fruits/vegetables daily  · Weight concerns: patient is overweight (BMI 25 0-29 9)  · Exercise: 3-4 times a week on average        Depression Screening  PHQ-9 Depression Screening    PHQ-9:    Frequency of the following problems over the past two weeks:       Little interest or pleasure in doing things:  0 - not at all  Feeling down, depressed, or hopeless:  0 - not at all  PHQ-2 Score:  0       General Health  · Sleep: sleeps well  · Hearing: normal - bilateral   · Vision: most recent eye exam <1 year ago and wears glasses  · Dental: regular dental visits and brushes teeth twice daily  /GYN Health  · Last menstrual period:   · Contraceptive method: oral contraceptives  · History of STDs?: no      Review of Systems:     Review of Systems   Constitutional: Negative  HENT: Negative  Eyes: Negative  Respiratory: Negative  Cardiovascular: Negative  Gastrointestinal: Positive for constipation and diarrhea  Endocrine: Negative  Genitourinary: Negative  Musculoskeletal: Negative  Skin: Negative  Allergic/Immunologic: Negative  Neurological: Negative  Hematological: Negative  Psychiatric/Behavioral: Negative  Past Medical History:     Past Medical History:   Diagnosis Date    Ovarian cyst       Past Surgical History:     Past Surgical History:   Procedure Laterality Date    NO PAST SURGERIES        Social History:     Social History     Socioeconomic History    Marital status: Single     Spouse name: None    Number of children: None    Years of education: None    Highest education level: None   Occupational History     Employer: [email or web address]   Social Needs    Financial resource strain: None    Food insecurity:     Worry: None     Inability: None    Transportation needs:     Medical: None     Non-medical: None   Tobacco Use    Smoking status: Never Smoker    Smokeless tobacco: Never Used   Substance and Sexual Activity    Alcohol use: Yes     Comment: social - 1 times a week       Drug use: No    Sexual activity: Yes     Partners: Male     Birth control/protection: Pill   Lifestyle    Physical activity:     Days per week: None     Minutes per session: None    Stress: None   Relationships    Social connections:     Talks on phone: None     Gets together: None     Attends [employer] service: None     Active member of club or organization: None     Attends meetings of clubs or organizations: None     Relationship status: None    Intimate partner violence:     Fear of current or ex partner: None     Emotionally abused: None     Physically abused: None     Forced sexual activity: None   Other Topics Concern    None   Social History Narrative    Brushes teeth daily; regular dental care    Lives with parents/grandparents    Sleeps 8-10 hours a day      Family History:     Family History   Problem Relation Age of Onset    No Known Problems Mother     Hypertension Father     Hyperlipidemia Father       Current Medications:     Current Outpatient Medications   Medication Sig Dispense Refill    Drospiren-Eth Estrad-Levomefol 3-0 02-0 451 MG TABS Take 1 tablet by mouth daily 84 tablet 3    polyethylene glycol (MIRALAX) 17 g packet Take 17 g by mouth daily for 3 days 51 g 0     No current facility-administered medications for this visit  Allergies: Allergies   Allergen Reactions    Amoxicillin GI Intolerance      Objective:     /77 (BP Location: Left arm, Patient Position: Sitting, Cuff Size: Standard)   Pulse 65   Temp 98 3 °F (36 8 °C)   Resp 18   Ht 5' 1 42" (1 56 m)   Wt 63 kg (139 lb)   SpO2 98%   BMI 25 91 kg/m²     Physical Exam   Constitutional: She is oriented to person, place, and time  She appears well-developed and well-nourished  HENT:   Head: Normocephalic and atraumatic  Eyes: Pupils are equal, round, and reactive to light  EOM are normal    Neck: Normal range of motion  Neck supple  Cardiovascular: Normal rate and regular rhythm  Pulmonary/Chest: Effort normal and breath sounds normal  No respiratory distress  She has no wheezes  Abdominal: Soft  Bowel sounds are normal    Musculoskeletal: Normal range of motion  She exhibits no tenderness or deformity     Neurological: She is alert and oriented to person, place, and time  Skin: Skin is warm  Psychiatric: She has a normal mood and affect   Her behavior is normal         Health Maintenance:     Health Maintenance   Topic Date Due    BMI: Followup Plan  07/31/2014    DTaP,Tdap,and Td Vaccines (7 - Td) 03/22/2017    INFLUENZA VACCINE  10/06/2020 (Originally 7/1/2018)    Depression Screening PHQ  02/15/2020    BMI: Adult  02/15/2020    PAP SMEAR  09/20/2020    HEPATITIS B VACCINES  Completed     Immunization History   Administered Date(s) Administered    DTaP 5 1996, 1996, 02/11/1997, 08/13/1998, 08/06/2001    HPV Quadrivalent 08/13/2014, 03/06/2015    Hep A, adult 08/13/2014, 03/06/2015    Hep B, adult 1996, 1996, 02/11/1997    Hib (PRP-OMP) 1996, 1996, 02/11/1997, 08/13/1998    MMR 11/19/1997, 08/06/2001    Meningococcal, Unknown Serogroups 02/19/2009, 08/13/2014    OPV 1996, 01/09/1997, 08/13/1998, 08/06/2001    Tdap 03/22/2007    Tuberculin Skin Test-PPD Intradermal 08/06/2001, 08/13/2014    Varicella 11/19/1997, 03/22/2007       Candace Hahn MD  [address] [facility]

## 2019-02-15 NOTE — PATIENT INSTRUCTIONS

## 2019-03-25 ENCOUNTER — HOSPITAL ENCOUNTER (OUTPATIENT)
Dept: RADIOLOGY | Age: 24
Discharge: HOME | End: 2019-03-25
Payer: COMMERCIAL

## 2019-03-25 DIAGNOSIS — D27.1 DERMOID CYST OF LEFT OVARY: ICD-10-CM

## 2019-03-25 PROCEDURE — 76856 US EXAM PELVIC COMPLETE: CPT

## 2019-03-25 PROCEDURE — 76830 TRANSVAGINAL US NON-OB: CPT

## 2019-03-26 DIAGNOSIS — D27.1 DERMOID CYST OF LEFT OVARY: Primary | ICD-10-CM

## 2019-03-27 ENCOUNTER — OFFICE VISIT (OUTPATIENT)
Dept: GASTROENTEROLOGY | Facility: CLINIC | Age: 24
End: 2019-03-27
Payer: COMMERCIAL

## 2019-03-27 VITALS
SYSTOLIC BLOOD PRESSURE: 116 MMHG | TEMPERATURE: 97.6 F | HEART RATE: 73 BPM | RESPIRATION RATE: 18 BRPM | HEIGHT: 61 IN | WEIGHT: 138 LBS | BODY MASS INDEX: 26.06 KG/M2 | DIASTOLIC BLOOD PRESSURE: 73 MMHG

## 2019-03-27 DIAGNOSIS — R10.13 EPIGASTRIC PAIN: Primary | ICD-10-CM

## 2019-03-27 DIAGNOSIS — R19.7 DIARRHEA, UNSPECIFIED TYPE: ICD-10-CM

## 2019-03-27 DIAGNOSIS — R79.89 ELEVATED LFTS: ICD-10-CM

## 2019-03-27 PROBLEM — R10.9 ABDOMINAL PAIN: Status: ACTIVE | Noted: 2019-03-27

## 2019-03-27 PROCEDURE — 99244 OFF/OP CNSLTJ NEW/EST MOD 40: CPT | Performed by: INTERNAL MEDICINE

## 2019-03-27 RX ORDER — DICYCLOMINE HYDROCHLORIDE 10 MG/1
10 CAPSULE ORAL
Qty: 120 CAPSULE | Refills: 3 | Status: SHIPPED | OUTPATIENT
Start: 2019-03-27 | End: 2019-04-18 | Stop reason: ALTCHOICE

## 2019-03-27 NOTE — PATIENT INSTRUCTIONS
Lactose free diet   2 fiber gummies daily  1 capful of miralax daily  Bentyl 2-3 times daily    Patient will call for a 3 month follow up with dr Dilia Zuleta    Patient provided requisition at check out for labs

## 2019-03-27 NOTE — PROGRESS NOTES
Consultation - [facility]  Tiburcio Melgoza 26 y o  female MRN: [id number]          Assessment & Plan:    Pleasant and healthy 56-year-old female with a longstanding history of alternating diarrhea and constipation, recent elevated LFTs do emergency room visit  1  Alternating diarrhea and constipation:  Symptoms are most consistent with irritable bowel syndrome, she has had longstanding history of symptoms with no red flag symptoms at this time  -recommended a regimen of daily fiber supplementation and MiraLax  -recommended regimen of daily dicyclomine to help with abdominal pain and cramping  -monitor diet also stick to strict lactose-free diet to see if that may help  -we will check sed rate, CRP, celiac panel to evaluate for luminal disease  -we will see the patient back in 2 months time if symptoms are persistent or not improving will proceed with endoscopic evaluation    2  Elevated LFTs:  Given the other laboratory studies including atypical lymphocytes and positive mono this is most likely secondary to EBV infection  -we will repeat LFTs at this time, we will also check a chronic hepatitis panel due to his history of tattoo and blood transfusion      _____________________________________________________________        CC:  Abdominal pain with alternating diarrhea and constipation    HPI:  Tiburcio Melgoza is a 26 y  o female who was referred for evaluation of abdominal pain with alternating diarrhea and constipation  As you know this is a 56-year-old female who reports a longstanding history of alternating diarrhea and constipation for as long she can remember  She typically has 2 loose stools with significant cramping just before this  And then she may have no bowel movement for several days up to 2 or 3 days between that  Denies any melena or rectal bleeding  She had been woken in the middle the night recently with abdominal pain and loose stools    Very rarely has x-rays with heartburn which typically diet mediated  Patient was recently recommended to take daily probiotic  She notes that this has helped with some of the bloating  She has a history of ovarian cyst     Patient denies any history of illicit substances though she does smoke marijuana occasionally, she has a tattoo  Reports that she may have had a blood transfusion when she was a feeds  Recently patient presented to the emergency room and was noted to have markedly elevated LFTs, had a recent normal ultrasound  Her laboratory studies were notable for atypical lymphocytes, and her Monospot was positive  She works at a inhaled [employer] company in   Family history is notable for brother with irritable bowel syndrome, no history of inflammatory bowel disease  ROS:  The remainder of the ROS was negative except for the pertinent positives mentioned in HPI  Allergies: Amoxicillin    Medications:   Current Outpatient Medications:     Drospiren-Eth Estrad-Levomefol 3-0 02-0 451 MG TABS, Take 1 tablet by mouth daily, Disp: 84 tablet, Rfl: 3    dicyclomine (BENTYL) 10 mg capsule, Take 1 capsule (10 mg total) by mouth 4 (four) times a day (before meals and at bedtime), Disp: 120 capsule, Rfl: 3    polyethylene glycol (MIRALAX) 17 g packet, Take 17 g by mouth daily for 3 days, Disp: 51 g, Rfl: 0'    Past Medical History:   Diagnosis Date    Ovarian cyst        Past Surgical History:   Procedure Laterality Date    NO PAST SURGERIES         Family History   Problem Relation Age of Onset    No Known Problems Mother     Hypertension Father     Hyperlipidemia Father         reports that she has never smoked  She has never used smokeless tobacco  She reports that she drinks alcohol  She reports that she does not use drugs            Physical Exam:     /73 (BP Location: Left arm, Patient Position: Sitting, Cuff Size: Standard)   Pulse 73   Temp 97 6 °F (36 4 °C) (Tympanic) Resp 18   Ht 5' 1" (1 549 m)   Wt 62 6 kg (138 lb)   BMI 26 07 kg/m²     Gen: wn/wd, NAD, healthy-appearing female  HEENT: anicteric, MMM, no cervical LAD  CVS: RRR, no m/r/g  CHEST: CTA b/l  ABD: +BS, soft, NT,ND, no hepatosplenomegaly  EXT: no c/c/e  NEURO: aaox3  SKIN: NO rashes

## 2019-03-27 NOTE — LETTER
March 27, 2019     Referral [address] [place] [id number]    Patient: Isabel Parker   YOB: 1995   Date of Visit: 3/27/2019       Dear Dr Ruben Mccann:    Thank you for referring Dennis Lee to me for evaluation  Below are my notes for this consultation  If you have questions, please do not hesitate to call me  I look forward to following your patient along with you  Sincerely,        Raphael Angelo MD        CC: MD Raphael Liu MD  3/27/2019  4:13 PM  Sign at close encounter  Consultation - [facility]  Isabel Parker 26 y o  female MRN: [id number]          Assessment & Plan:    Pleasant and healthy 23-year-old female with a longstanding history of alternating diarrhea and constipation, recent elevated LFTs do emergency room visit  1  Alternating diarrhea and constipation:  Symptoms are most consistent with irritable bowel syndrome, she has had longstanding history of symptoms with no red flag symptoms at this time  -recommended a regimen of daily fiber supplementation and MiraLax  -recommended regimen of daily dicyclomine to help with abdominal pain and cramping  -monitor diet also stick to strict lactose-free diet to see if that may help  -we will check sed rate, CRP, celiac panel to evaluate for luminal disease  -we will see the patient back in 2 months time if symptoms are persistent or not improving will proceed with endoscopic evaluation    2  Elevated LFTs:  Given the other laboratory studies including atypical lymphocytes and positive mono this is most likely secondary to EBV infection  -we will repeat LFTs at this time, we will also check a chronic hepatitis panel due to his history of tattoo and blood transfusion      _____________________________________________________________        CC:  Abdominal pain with alternating diarrhea and constipation    HPI:  Isabel Parker is a 26 y  o female who was referred for evaluation of abdominal pain with alternating diarrhea and constipation  As you know this is a 15-year-old female who reports a longstanding history of alternating diarrhea and constipation for as long she can remember  She typically has 2 loose stools with significant cramping just before this  And then she may have no bowel movement for several days up to 2 or 3 days between that  Denies any melena or rectal bleeding  She had been woken in the middle the night recently with abdominal pain and loose stools  Very rarely has x-rays with heartburn which typically diet mediated  Patient was recently recommended to take daily probiotic  She notes that this has helped with some of the bloating  She has a history of ovarian cyst     Patient denies any history of illicit substances though she does smoke marijuana occasionally, she has a tattoo  Reports that she may have had a blood transfusion when she was a feeds  Recently patient presented to the emergency room and was noted to have markedly elevated LFTs, had a recent normal ultrasound  Her laboratory studies were notable for atypical lymphocytes, and her Monospot was positive  She works at a [employer] company in   Family history is notable for brother with irritable bowel syndrome, no history of inflammatory bowel disease  ROS:  The remainder of the ROS was negative except for the pertinent positives mentioned in HPI           Allergies: Amoxicillin    Medications:   Current Outpatient Medications:     Drospiren-Eth Estrad-Levomefol 3-0 02-0 451 MG TABS, Take 1 tablet by mouth daily, Disp: 84 tablet, Rfl: 3    dicyclomine (BENTYL) 10 mg capsule, Take 1 capsule (10 mg total) by mouth 4 (four) times a day (before meals and at bedtime), Disp: 120 capsule, Rfl: 3    polyethylene glycol (MIRALAX) 17 g packet, Take 17 g by mouth daily for 3 days, Disp: 51 g, Rfl: 0'    Past Medical History:   Diagnosis Date    Ovarian cyst        Past Surgical History:   Procedure Laterality Date    NO PAST SURGERIES         Family History   Problem Relation Age of Onset    No Known Problems Mother     Hypertension Father     Hyperlipidemia Father         reports that she has never smoked  She has never used smokeless tobacco  She reports that she drinks alcohol  She reports that she does not use drugs            Physical Exam:     /73 (BP Location: Left arm, Patient Position: Sitting, Cuff Size: Standard)   Pulse 73   Temp 97 6 °F (36 4 °C) (Tympanic)   Resp 18   Ht 5' 1" (1 549 m)   Wt 62 6 kg (138 lb)   BMI 26 07 kg/m²      Gen: wn/wd, NAD, healthy-appearing female  HEENT: anicteric, MMM, no cervical LAD  CVS: RRR, no m/r/g  CHEST: CTA b/l  ABD: +BS, soft, NT,ND, no hepatosplenomegaly  EXT: no c/c/e  NEURO: aaox3  SKIN: NO rashes

## 2019-04-10 ENCOUNTER — APPOINTMENT (OUTPATIENT)
Dept: LAB | Age: 24
End: 2019-04-10
Payer: COMMERCIAL

## 2019-04-10 ENCOUNTER — TRANSCRIBE ORDERS (OUTPATIENT)
Dept: ADMINISTRATIVE | Age: 24
End: 2019-04-10

## 2019-04-10 DIAGNOSIS — R10.13 EPIGASTRIC PAIN: ICD-10-CM

## 2019-04-10 LAB
ALBUMIN SERPL BCP-MCNC: 3.4 G/DL (ref 3.5–5)
ALP SERPL-CCNC: 63 U/L (ref 46–116)
ALT SERPL W P-5'-P-CCNC: 17 U/L (ref 12–78)
ANION GAP SERPL CALCULATED.3IONS-SCNC: 3 MMOL/L (ref 4–13)
AST SERPL W P-5'-P-CCNC: 12 U/L (ref 5–45)
BASOPHILS # BLD AUTO: 0.02 THOUSANDS/ΜL (ref 0–0.1)
BASOPHILS NFR BLD AUTO: 0 % (ref 0–1)
BILIRUB DIRECT SERPL-MCNC: 0.07 MG/DL (ref 0–0.2)
BILIRUB SERPL-MCNC: 0.34 MG/DL (ref 0.2–1)
BUN SERPL-MCNC: 10 MG/DL (ref 5–25)
CALCIUM SERPL-MCNC: 8.9 MG/DL (ref 8.3–10.1)
CHLORIDE SERPL-SCNC: 107 MMOL/L (ref 100–108)
CO2 SERPL-SCNC: 26 MMOL/L (ref 21–32)
CREAT SERPL-MCNC: 0.75 MG/DL (ref 0.6–1.3)
CRP SERPL QL: 4.1 MG/L
EOSINOPHIL # BLD AUTO: 0.13 THOUSAND/ΜL (ref 0–0.61)
EOSINOPHIL NFR BLD AUTO: 3 % (ref 0–6)
ERYTHROCYTE [DISTWIDTH] IN BLOOD BY AUTOMATED COUNT: 13.9 % (ref 11.6–15.1)
ERYTHROCYTE [SEDIMENTATION RATE] IN BLOOD: 9 MM/HOUR (ref 0–20)
GFR SERPL CREATININE-BSD FRML MDRD: 114 ML/MIN/1.73SQ M
GLUCOSE P FAST SERPL-MCNC: 75 MG/DL (ref 65–99)
HBV CORE AB SER QL: NORMAL
HBV CORE IGM SER QL: NORMAL
HBV SURFACE AG SER QL: NORMAL
HCT VFR BLD AUTO: 41.3 % (ref 34.8–46.1)
HCV AB SER QL: NORMAL
HGB BLD-MCNC: 13 G/DL (ref 11.5–15.4)
IMM GRANULOCYTES # BLD AUTO: 0 THOUSAND/UL (ref 0–0.2)
IMM GRANULOCYTES NFR BLD AUTO: 0 % (ref 0–2)
LYMPHOCYTES # BLD AUTO: 2.09 THOUSANDS/ΜL (ref 0.6–4.47)
LYMPHOCYTES NFR BLD AUTO: 43 % (ref 14–44)
MCH RBC QN AUTO: 28.8 PG (ref 26.8–34.3)
MCHC RBC AUTO-ENTMCNC: 31.5 G/DL (ref 31.4–37.4)
MCV RBC AUTO: 91 FL (ref 82–98)
MONOCYTES # BLD AUTO: 0.48 THOUSAND/ΜL (ref 0.17–1.22)
MONOCYTES NFR BLD AUTO: 10 % (ref 4–12)
NEUTROPHILS # BLD AUTO: 2.12 THOUSANDS/ΜL (ref 1.85–7.62)
NEUTS SEG NFR BLD AUTO: 44 % (ref 43–75)
NRBC BLD AUTO-RTO: 0 /100 WBCS
PLATELET # BLD AUTO: 251 THOUSANDS/UL (ref 149–390)
PMV BLD AUTO: 9.7 FL (ref 8.9–12.7)
POTASSIUM SERPL-SCNC: 4.2 MMOL/L (ref 3.5–5.3)
PROT SERPL-MCNC: 7.2 G/DL (ref 6.4–8.2)
RBC # BLD AUTO: 4.52 MILLION/UL (ref 3.81–5.12)
SODIUM SERPL-SCNC: 136 MMOL/L (ref 136–145)
TSH SERPL DL<=0.05 MIU/L-ACNC: 1.33 UIU/ML (ref 0.36–3.74)
WBC # BLD AUTO: 4.84 THOUSAND/UL (ref 4.31–10.16)

## 2019-04-10 PROCEDURE — 86705 HEP B CORE ANTIBODY IGM: CPT

## 2019-04-10 PROCEDURE — 80076 HEPATIC FUNCTION PANEL: CPT

## 2019-04-10 PROCEDURE — 85652 RBC SED RATE AUTOMATED: CPT

## 2019-04-10 PROCEDURE — 84443 ASSAY THYROID STIM HORMONE: CPT

## 2019-04-10 PROCEDURE — 82784 ASSAY IGA/IGD/IGG/IGM EACH: CPT

## 2019-04-10 PROCEDURE — 80048 BASIC METABOLIC PNL TOTAL CA: CPT

## 2019-04-10 PROCEDURE — 83516 IMMUNOASSAY NONANTIBODY: CPT

## 2019-04-10 PROCEDURE — 86140 C-REACTIVE PROTEIN: CPT

## 2019-04-10 PROCEDURE — 86704 HEP B CORE ANTIBODY TOTAL: CPT

## 2019-04-10 PROCEDURE — 36415 COLL VENOUS BLD VENIPUNCTURE: CPT

## 2019-04-10 PROCEDURE — 86803 HEPATITIS C AB TEST: CPT

## 2019-04-10 PROCEDURE — 85025 COMPLETE CBC W/AUTO DIFF WBC: CPT

## 2019-04-10 PROCEDURE — 87340 HEPATITIS B SURFACE AG IA: CPT

## 2019-04-10 PROCEDURE — 86255 FLUORESCENT ANTIBODY SCREEN: CPT

## 2019-04-11 ENCOUNTER — TELEPHONE (OUTPATIENT)
Dept: GASTROENTEROLOGY | Facility: AMBULARY SURGERY CENTER | Age: 24
End: 2019-04-11

## 2019-04-11 LAB
ENDOMYSIUM IGA SER QL: NEGATIVE
GLIADIN PEPTIDE IGA SER-ACNC: 4 UNITS (ref 0–19)
GLIADIN PEPTIDE IGG SER-ACNC: 2 UNITS (ref 0–19)
IGA SERPL-MCNC: 242 MG/DL (ref 87–352)
TTG IGA SER-ACNC: <2 U/ML (ref 0–3)
TTG IGG SER-ACNC: <2 U/ML (ref 0–5)

## 2019-04-12 ENCOUNTER — TELEPHONE (OUTPATIENT)
Dept: GASTROENTEROLOGY | Facility: CLINIC | Age: 24
End: 2019-04-12

## 2019-04-18 ENCOUNTER — OFFICE VISIT (OUTPATIENT)
Dept: OBGYN CLINIC | Facility: CLINIC | Age: 24
End: 2019-04-18
Payer: COMMERCIAL

## 2019-04-18 VITALS
HEIGHT: 63 IN | DIASTOLIC BLOOD PRESSURE: 80 MMHG | BODY MASS INDEX: 23.74 KG/M2 | SYSTOLIC BLOOD PRESSURE: 128 MMHG | WEIGHT: 134 LBS

## 2019-04-18 DIAGNOSIS — D27.1 DERMOID CYST OF LEFT OVARY: Primary | ICD-10-CM

## 2019-04-18 PROCEDURE — 99214 OFFICE O/P EST MOD 30 MIN: CPT | Performed by: OBSTETRICS & GYNECOLOGY

## 2019-04-22 ENCOUNTER — TELEPHONE (OUTPATIENT)
Dept: OBGYN CLINIC | Facility: CLINIC | Age: 24
End: 2019-04-22

## 2019-04-23 PROBLEM — D27.9 DERMOID CYST OF OVARY: Status: ACTIVE | Noted: 2019-04-23

## 2019-05-29 ENCOUNTER — ANESTHESIA EVENT (OUTPATIENT)
Dept: PERIOP | Facility: HOSPITAL | Age: 24
End: 2019-05-29
Payer: COMMERCIAL

## 2019-06-04 ENCOUNTER — APPOINTMENT (OUTPATIENT)
Dept: LAB | Facility: CLINIC | Age: 24
End: 2019-06-04
Payer: COMMERCIAL

## 2019-06-04 ENCOUNTER — OFFICE VISIT (OUTPATIENT)
Dept: OBGYN CLINIC | Facility: CLINIC | Age: 24
End: 2019-06-04
Payer: COMMERCIAL

## 2019-06-04 ENCOUNTER — TRANSCRIBE ORDERS (OUTPATIENT)
Dept: LAB | Facility: CLINIC | Age: 24
End: 2019-06-04

## 2019-06-04 VITALS — SYSTOLIC BLOOD PRESSURE: 108 MMHG | BODY MASS INDEX: 25.2 KG/M2 | WEIGHT: 140 LBS | DIASTOLIC BLOOD PRESSURE: 66 MMHG

## 2019-06-04 DIAGNOSIS — D27.9 DERMOID CYST OF OVARY, UNSPECIFIED LATERALITY: ICD-10-CM

## 2019-06-04 DIAGNOSIS — D27.9 DERMOID CYST OF OVARY, UNSPECIFIED LATERALITY: Primary | ICD-10-CM

## 2019-06-04 LAB
ERYTHROCYTE [DISTWIDTH] IN BLOOD BY AUTOMATED COUNT: 13.9 % (ref 11.6–15.1)
HCT VFR BLD AUTO: 42.1 % (ref 34.8–46.1)
HGB BLD-MCNC: 13.3 G/DL (ref 11.5–15.4)
MCH RBC QN AUTO: 29 PG (ref 26.8–34.3)
MCHC RBC AUTO-ENTMCNC: 31.6 G/DL (ref 31.4–37.4)
MCV RBC AUTO: 92 FL (ref 82–98)
PLATELET # BLD AUTO: 276 THOUSANDS/UL (ref 149–390)
PMV BLD AUTO: 9.7 FL (ref 8.9–12.7)
RBC # BLD AUTO: 4.58 MILLION/UL (ref 3.81–5.12)
WBC # BLD AUTO: 6.48 THOUSAND/UL (ref 4.31–10.16)

## 2019-06-04 PROCEDURE — 36415 COLL VENOUS BLD VENIPUNCTURE: CPT

## 2019-06-04 PROCEDURE — 99213 OFFICE O/P EST LOW 20 MIN: CPT | Performed by: OBSTETRICS & GYNECOLOGY

## 2019-06-04 PROCEDURE — 85027 COMPLETE CBC AUTOMATED: CPT

## 2019-06-12 ENCOUNTER — TELEPHONE (OUTPATIENT)
Dept: OBGYN CLINIC | Facility: CLINIC | Age: 24
End: 2019-06-12

## 2019-06-13 ENCOUNTER — HOSPITAL ENCOUNTER (OUTPATIENT)
Facility: HOSPITAL | Age: 24
Setting detail: OUTPATIENT SURGERY
Discharge: HOME | End: 2019-06-13
Attending: OBSTETRICS & GYNECOLOGY | Admitting: OBSTETRICS & GYNECOLOGY
Payer: COMMERCIAL

## 2019-06-13 ENCOUNTER — TELEPHONE (OUTPATIENT)
Dept: GASTROENTEROLOGY | Facility: AMBULARY SURGERY CENTER | Age: 24
End: 2019-06-13

## 2019-06-13 ENCOUNTER — ANESTHESIA (OUTPATIENT)
Dept: PERIOP | Facility: HOSPITAL | Age: 24
End: 2019-06-13
Payer: COMMERCIAL

## 2019-06-13 VITALS
OXYGEN SATURATION: 100 % | HEIGHT: 62 IN | DIASTOLIC BLOOD PRESSURE: 57 MMHG | BODY MASS INDEX: 25.21 KG/M2 | HEART RATE: 59 BPM | SYSTOLIC BLOOD PRESSURE: 98 MMHG | WEIGHT: 137 LBS | RESPIRATION RATE: 18 BRPM | TEMPERATURE: 97.2 F

## 2019-06-13 DIAGNOSIS — Z87.42 STATUS POST OVARIAN CYSTECTOMY: Primary | ICD-10-CM

## 2019-06-13 DIAGNOSIS — Z98.890 STATUS POST OVARIAN CYSTECTOMY: Primary | ICD-10-CM

## 2019-06-13 DIAGNOSIS — D27.9 DERMOID CYST OF OVARY, UNSPECIFIED LATERALITY: ICD-10-CM

## 2019-06-13 LAB
EXT PREGNANCY TEST URINE: NEGATIVE
EXT. CONTROL: NORMAL

## 2019-06-13 PROCEDURE — 88305 TISSUE EXAM BY PATHOLOGIST: CPT | Performed by: PATHOLOGY

## 2019-06-13 PROCEDURE — 58662 LAPAROSCOPY EXCISE LESIONS: CPT | Performed by: OBSTETRICS & GYNECOLOGY

## 2019-06-13 PROCEDURE — 88307 TISSUE EXAM BY PATHOLOGIST: CPT | Performed by: PATHOLOGY

## 2019-06-13 PROCEDURE — 81025 URINE PREGNANCY TEST: CPT | Performed by: OBSTETRICS & GYNECOLOGY

## 2019-06-13 RX ORDER — PROPOFOL 10 MG/ML
INJECTION, EMULSION INTRAVENOUS AS NEEDED
Status: DISCONTINUED | OUTPATIENT
Start: 2019-06-13 | End: 2019-06-13 | Stop reason: SURG

## 2019-06-13 RX ORDER — OXYCODONE HYDROCHLORIDE AND ACETAMINOPHEN 5; 325 MG/1; MG/1
1 TABLET ORAL EVERY 6 HOURS PRN
Qty: 6 TABLET | Refills: 0 | Status: SHIPPED | OUTPATIENT
Start: 2019-06-13 | End: 2019-06-13 | Stop reason: SDUPTHER

## 2019-06-13 RX ORDER — SODIUM CHLORIDE, SODIUM LACTATE, POTASSIUM CHLORIDE, CALCIUM CHLORIDE 600; 310; 30; 20 MG/100ML; MG/100ML; MG/100ML; MG/100ML
125 INJECTION, SOLUTION INTRAVENOUS CONTINUOUS
Status: DISCONTINUED | OUTPATIENT
Start: 2019-06-13 | End: 2019-06-13 | Stop reason: HOSPADM

## 2019-06-13 RX ORDER — SODIUM CHLORIDE, SODIUM LACTATE, POTASSIUM CHLORIDE, CALCIUM CHLORIDE 600; 310; 30; 20 MG/100ML; MG/100ML; MG/100ML; MG/100ML
100 INJECTION, SOLUTION INTRAVENOUS CONTINUOUS
Status: CANCELLED | OUTPATIENT
Start: 2019-06-13

## 2019-06-13 RX ORDER — DEXAMETHASONE SODIUM PHOSPHATE 4 MG/ML
INJECTION, SOLUTION INTRA-ARTICULAR; INTRALESIONAL; INTRAMUSCULAR; INTRAVENOUS; SOFT TISSUE AS NEEDED
Status: DISCONTINUED | OUTPATIENT
Start: 2019-06-13 | End: 2019-06-13 | Stop reason: SURG

## 2019-06-13 RX ORDER — HYDROMORPHONE HCL/PF 1 MG/ML
SYRINGE (ML) INJECTION AS NEEDED
Status: DISCONTINUED | OUTPATIENT
Start: 2019-06-13 | End: 2019-06-13 | Stop reason: SURG

## 2019-06-13 RX ORDER — FENTANYL CITRATE/PF 50 MCG/ML
25 SYRINGE (ML) INJECTION
Status: DISCONTINUED | OUTPATIENT
Start: 2019-06-13 | End: 2019-06-13 | Stop reason: HOSPADM

## 2019-06-13 RX ORDER — IBUPROFEN 600 MG/1
600 TABLET ORAL EVERY 6 HOURS PRN
Status: DISCONTINUED | OUTPATIENT
Start: 2019-06-13 | End: 2019-06-13 | Stop reason: HOSPADM

## 2019-06-13 RX ORDER — IBUPROFEN 600 MG/1
600 TABLET ORAL EVERY 6 HOURS PRN
Qty: 30 TABLET | Refills: 0 | Status: SHIPPED | OUTPATIENT
Start: 2019-06-13 | End: 2019-11-07

## 2019-06-13 RX ORDER — BUPIVACAINE HYDROCHLORIDE 2.5 MG/ML
INJECTION, SOLUTION INFILTRATION; PERINEURAL AS NEEDED
Status: DISCONTINUED | OUTPATIENT
Start: 2019-06-13 | End: 2019-06-13 | Stop reason: HOSPADM

## 2019-06-13 RX ORDER — SODIUM CHLORIDE, SODIUM LACTATE, POTASSIUM CHLORIDE, CALCIUM CHLORIDE 600; 310; 30; 20 MG/100ML; MG/100ML; MG/100ML; MG/100ML
INJECTION, SOLUTION INTRAVENOUS CONTINUOUS PRN
Status: DISCONTINUED | OUTPATIENT
Start: 2019-06-13 | End: 2019-06-13 | Stop reason: SURG

## 2019-06-13 RX ORDER — ROCURONIUM BROMIDE 10 MG/ML
INJECTION, SOLUTION INTRAVENOUS AS NEEDED
Status: DISCONTINUED | OUTPATIENT
Start: 2019-06-13 | End: 2019-06-13 | Stop reason: SURG

## 2019-06-13 RX ORDER — DOCUSATE SODIUM 100 MG/1
100 CAPSULE, LIQUID FILLED ORAL 2 TIMES DAILY
Qty: 30 CAPSULE | Refills: 0 | Status: SHIPPED | OUTPATIENT
Start: 2019-06-13 | End: 2019-09-12

## 2019-06-13 RX ORDER — HYDROMORPHONE HCL/PF 1 MG/ML
0.2 SYRINGE (ML) INJECTION
Status: DISCONTINUED | OUTPATIENT
Start: 2019-06-13 | End: 2019-06-13 | Stop reason: HOSPADM

## 2019-06-13 RX ORDER — OXYCODONE HYDROCHLORIDE 5 MG/1
5 TABLET ORAL EVERY 4 HOURS PRN
Status: DISCONTINUED | OUTPATIENT
Start: 2019-06-13 | End: 2019-06-13 | Stop reason: HOSPADM

## 2019-06-13 RX ORDER — ACETAMINOPHEN 325 MG/1
650 TABLET ORAL EVERY 6 HOURS PRN
Status: DISCONTINUED | OUTPATIENT
Start: 2019-06-13 | End: 2019-06-13 | Stop reason: HOSPADM

## 2019-06-13 RX ORDER — ONDANSETRON 2 MG/ML
4 INJECTION INTRAMUSCULAR; INTRAVENOUS EVERY 4 HOURS PRN
Status: DISCONTINUED | OUTPATIENT
Start: 2019-06-13 | End: 2019-06-13 | Stop reason: HOSPADM

## 2019-06-13 RX ORDER — FENTANYL CITRATE 50 UG/ML
INJECTION, SOLUTION INTRAMUSCULAR; INTRAVENOUS AS NEEDED
Status: DISCONTINUED | OUTPATIENT
Start: 2019-06-13 | End: 2019-06-13 | Stop reason: SURG

## 2019-06-13 RX ORDER — GLYCOPYRROLATE 0.2 MG/ML
INJECTION INTRAMUSCULAR; INTRAVENOUS AS NEEDED
Status: DISCONTINUED | OUTPATIENT
Start: 2019-06-13 | End: 2019-06-13 | Stop reason: SURG

## 2019-06-13 RX ORDER — METOCLOPRAMIDE HYDROCHLORIDE 5 MG/ML
10 INJECTION INTRAMUSCULAR; INTRAVENOUS EVERY 4 HOURS PRN
Status: DISCONTINUED | OUTPATIENT
Start: 2019-06-13 | End: 2019-06-13 | Stop reason: HOSPADM

## 2019-06-13 RX ORDER — HYDROMORPHONE HCL/PF 1 MG/ML
0.4 SYRINGE (ML) INJECTION
Status: DISCONTINUED | OUTPATIENT
Start: 2019-06-13 | End: 2019-06-13 | Stop reason: HOSPADM

## 2019-06-13 RX ORDER — LIDOCAINE HYDROCHLORIDE 10 MG/ML
INJECTION, SOLUTION INFILTRATION; PERINEURAL AS NEEDED
Status: DISCONTINUED | OUTPATIENT
Start: 2019-06-13 | End: 2019-06-13 | Stop reason: SURG

## 2019-06-13 RX ORDER — MAGNESIUM HYDROXIDE 1200 MG/15ML
LIQUID ORAL AS NEEDED
Status: DISCONTINUED | OUTPATIENT
Start: 2019-06-13 | End: 2019-06-13 | Stop reason: HOSPADM

## 2019-06-13 RX ORDER — ONDANSETRON 2 MG/ML
4 INJECTION INTRAMUSCULAR; INTRAVENOUS EVERY 6 HOURS PRN
Status: DISCONTINUED | OUTPATIENT
Start: 2019-06-13 | End: 2019-06-13 | Stop reason: HOSPADM

## 2019-06-13 RX ORDER — NEOSTIGMINE METHYLSULFATE 1 MG/ML
INJECTION INTRAVENOUS AS NEEDED
Status: DISCONTINUED | OUTPATIENT
Start: 2019-06-13 | End: 2019-06-13 | Stop reason: SURG

## 2019-06-13 RX ORDER — OXYCODONE HYDROCHLORIDE 5 MG/1
10 TABLET ORAL EVERY 4 HOURS PRN
Status: DISCONTINUED | OUTPATIENT
Start: 2019-06-13 | End: 2019-06-13 | Stop reason: HOSPADM

## 2019-06-13 RX ORDER — ONDANSETRON 2 MG/ML
INJECTION INTRAMUSCULAR; INTRAVENOUS AS NEEDED
Status: DISCONTINUED | OUTPATIENT
Start: 2019-06-13 | End: 2019-06-13 | Stop reason: SURG

## 2019-06-13 RX ORDER — OXYCODONE HYDROCHLORIDE AND ACETAMINOPHEN 5; 325 MG/1; MG/1
1 TABLET ORAL EVERY 6 HOURS PRN
Qty: 6 TABLET | Refills: 0 | Status: SHIPPED | OUTPATIENT
Start: 2019-06-13 | End: 2019-06-23

## 2019-06-13 RX ORDER — MIDAZOLAM HYDROCHLORIDE 1 MG/ML
INJECTION INTRAMUSCULAR; INTRAVENOUS AS NEEDED
Status: DISCONTINUED | OUTPATIENT
Start: 2019-06-13 | End: 2019-06-13 | Stop reason: SURG

## 2019-06-13 RX ORDER — KETOROLAC TROMETHAMINE 30 MG/ML
INJECTION, SOLUTION INTRAMUSCULAR; INTRAVENOUS AS NEEDED
Status: DISCONTINUED | OUTPATIENT
Start: 2019-06-13 | End: 2019-06-13 | Stop reason: SURG

## 2019-06-13 RX ORDER — PROPOFOL 10 MG/ML
INJECTION, EMULSION INTRAVENOUS CONTINUOUS PRN
Status: DISCONTINUED | OUTPATIENT
Start: 2019-06-13 | End: 2019-06-13 | Stop reason: SURG

## 2019-06-13 RX ADMIN — DEXAMETHASONE SODIUM PHOSPHATE 4 MG: 4 INJECTION, SOLUTION INTRAMUSCULAR; INTRAVENOUS at 07:47

## 2019-06-13 RX ADMIN — ROCURONIUM BROMIDE 40 MG: 10 INJECTION, SOLUTION INTRAVENOUS at 07:39

## 2019-06-13 RX ADMIN — ONDANSETRON 4 MG: 2 INJECTION INTRAMUSCULAR; INTRAVENOUS at 10:59

## 2019-06-13 RX ADMIN — NEOSTIGMINE METHYLSULFATE 3 MG: 1 INJECTION INTRAVENOUS at 09:15

## 2019-06-13 RX ADMIN — HYDROMORPHONE HYDROCHLORIDE 0.5 MG: 1 INJECTION, SOLUTION INTRAMUSCULAR; INTRAVENOUS; SUBCUTANEOUS at 07:54

## 2019-06-13 RX ADMIN — GLYCOPYRROLATE 0.4 MG: 0.2 INJECTION, SOLUTION INTRAMUSCULAR; INTRAVENOUS at 09:17

## 2019-06-13 RX ADMIN — KETOROLAC TROMETHAMINE 30 MG: 30 INJECTION, SOLUTION INTRAMUSCULAR at 09:42

## 2019-06-13 RX ADMIN — ONDANSETRON 4 MG: 2 INJECTION INTRAMUSCULAR; INTRAVENOUS at 07:47

## 2019-06-13 RX ADMIN — PROPOFOL 200 MG: 10 INJECTION, EMULSION INTRAVENOUS at 07:39

## 2019-06-13 RX ADMIN — FENTANYL CITRATE 25 MCG: 50 INJECTION, SOLUTION INTRAMUSCULAR; INTRAVENOUS at 09:54

## 2019-06-13 RX ADMIN — FENTANYL CITRATE 25 MCG: 50 INJECTION, SOLUTION INTRAMUSCULAR; INTRAVENOUS at 09:59

## 2019-06-13 RX ADMIN — HYDROMORPHONE HYDROCHLORIDE 0.5 MG: 1 INJECTION, SOLUTION INTRAMUSCULAR; INTRAVENOUS; SUBCUTANEOUS at 09:42

## 2019-06-13 RX ADMIN — MIDAZOLAM HYDROCHLORIDE 2 MG: 1 INJECTION, SOLUTION INTRAMUSCULAR; INTRAVENOUS at 07:29

## 2019-06-13 RX ADMIN — LIDOCAINE HYDROCHLORIDE ANHYDROUS 50 MG: 10 INJECTION, SOLUTION INFILTRATION at 07:37

## 2019-06-13 RX ADMIN — IBUPROFEN 600 MG: 600 TABLET ORAL at 10:49

## 2019-06-13 RX ADMIN — FENTANYL CITRATE 50 MCG: 50 INJECTION INTRAMUSCULAR; INTRAVENOUS at 07:37

## 2019-06-13 RX ADMIN — SODIUM CHLORIDE, SODIUM LACTATE, POTASSIUM CHLORIDE, AND CALCIUM CHLORIDE 125 ML/HR: .6; .31; .03; .02 INJECTION, SOLUTION INTRAVENOUS at 11:01

## 2019-06-13 RX ADMIN — PROPOFOL 100 MCG/KG/MIN: 10 INJECTION, EMULSION INTRAVENOUS at 09:21

## 2019-06-13 RX ADMIN — FENTANYL CITRATE 50 MCG: 50 INJECTION INTRAMUSCULAR; INTRAVENOUS at 07:42

## 2019-06-13 RX ADMIN — SODIUM CHLORIDE, SODIUM LACTATE, POTASSIUM CHLORIDE, AND CALCIUM CHLORIDE: .6; .31; .03; .02 INJECTION, SOLUTION INTRAVENOUS at 07:32

## 2019-06-18 ENCOUNTER — TELEPHONE (OUTPATIENT)
Dept: OBGYN CLINIC | Facility: CLINIC | Age: 24
End: 2019-06-18

## 2019-06-25 ENCOUNTER — OFFICE VISIT (OUTPATIENT)
Dept: OBGYN CLINIC | Facility: CLINIC | Age: 24
End: 2019-06-25

## 2019-06-25 VITALS — WEIGHT: 136.6 LBS | DIASTOLIC BLOOD PRESSURE: 64 MMHG | SYSTOLIC BLOOD PRESSURE: 100 MMHG | BODY MASS INDEX: 24.98 KG/M2

## 2019-06-25 DIAGNOSIS — Z98.890 STATUS POST OVARIAN CYSTECTOMY: Primary | ICD-10-CM

## 2019-06-25 DIAGNOSIS — Z87.42 STATUS POST OVARIAN CYSTECTOMY: Primary | ICD-10-CM

## 2019-06-25 DIAGNOSIS — D27.1 DERMOID CYST OF LEFT OVARY: ICD-10-CM

## 2019-06-25 PROBLEM — D27.9 DERMOID CYST OF OVARY: Status: RESOLVED | Noted: 2019-04-23 | Resolved: 2019-06-25

## 2019-06-25 PROCEDURE — 99024 POSTOP FOLLOW-UP VISIT: CPT | Performed by: OBSTETRICS & GYNECOLOGY

## 2019-09-12 ENCOUNTER — TELEPHONE (OUTPATIENT)
Dept: GASTROENTEROLOGY | Facility: AMBULARY SURGERY CENTER | Age: 24
End: 2019-09-12

## 2019-09-12 DIAGNOSIS — R19.8 ALTERNATING CONSTIPATION AND DIARRHEA: Primary | ICD-10-CM

## 2019-09-12 NOTE — TELEPHONE ENCOUNTER
Emailed prep instructions to email listed below  Please send suprep order to Pharmacy on file  Thank you!

## 2019-09-12 NOTE — PRE-PROCEDURE INSTRUCTIONS
Pre-Surgery Instructions:   Medication Instructions    Drospiren-Eth Estrad-Levomefol 3-0 02-0 451 MG TABS Patient was instructed by Physician and understands   ibuprofen (MOTRIN) 600 mg tablet Patient was instructed by Physician and understands  Pt to follow Dr Trisha Antonio instructions    brother Liudmila Overton [phone]

## 2019-09-15 ENCOUNTER — ANESTHESIA EVENT (OUTPATIENT)
Dept: GASTROENTEROLOGY | Facility: AMBULARY SURGERY CENTER | Age: 24
End: 2019-09-15

## 2019-09-15 NOTE — ANESTHESIA PREPROCEDURE EVALUATION
Review of Systems/Medical History  Patient summary reviewed  Chart reviewed  History of anesthetic complications (pt has panic attacks when waking up from anesthesia - she benefits from having family there when she wakes up)     Cardiovascular  Negative cardio ROS Exercise tolerance (METS): >4,  No angina , No RAMOS,    Pulmonary  Negative pulmonary ROS Not a smoker , No shortness of breath, No recent URI ,        GI/Hepatic    Bowel prep       Negative  ROS        Endo/Other  Negative endo/other ROS      GYN      Comment: Left ovarian cyst     Hematology  Negative hematology ROS      Musculoskeletal  Negative musculoskeletal ROS        Neurology  Negative neurology ROS      Psychology   Anxiety,              Physical Exam    Airway    Mallampati score: I  TM Distance: >3 FB  Neck ROM: full     Dental   No notable dental hx     Cardiovascular  Comment: Negative ROS, Cardiovascular exam normal    Pulmonary  Pulmonary exam normal     Other Findings        Anesthesia Plan  ASA Score- 1     Anesthesia Type- IV sedation with anesthesia with ASA Monitors  Additional Monitors:   Airway Plan:         Plan Factors-    Induction- intravenous  Postoperative Plan-     Informed Consent- Anesthetic plan and risks discussed with patient

## 2019-09-16 ENCOUNTER — ANESTHESIA (OUTPATIENT)
Dept: GASTROENTEROLOGY | Facility: AMBULARY SURGERY CENTER | Age: 24
End: 2019-09-16

## 2019-09-16 ENCOUNTER — HOSPITAL ENCOUNTER (OUTPATIENT)
Dept: GASTROENTEROLOGY | Facility: AMBULARY SURGERY CENTER | Age: 24
Setting detail: OUTPATIENT SURGERY
Discharge: HOME | End: 2019-09-16
Attending: INTERNAL MEDICINE | Admitting: INTERNAL MEDICINE
Payer: COMMERCIAL

## 2019-09-16 VITALS
SYSTOLIC BLOOD PRESSURE: 112 MMHG | DIASTOLIC BLOOD PRESSURE: 55 MMHG | WEIGHT: 136 LBS | TEMPERATURE: 97.1 F | HEIGHT: 62 IN | OXYGEN SATURATION: 98 % | BODY MASS INDEX: 25.03 KG/M2 | HEART RATE: 76 BPM | RESPIRATION RATE: 16 BRPM

## 2019-09-16 DIAGNOSIS — R19.7 DIARRHEA, UNSPECIFIED TYPE: ICD-10-CM

## 2019-09-16 DIAGNOSIS — R10.13 EPIGASTRIC PAIN: ICD-10-CM

## 2019-09-16 LAB
EXT PREGNANCY TEST URINE: NEGATIVE
EXT. CONTROL: NORMAL

## 2019-09-16 PROCEDURE — 88305 TISSUE EXAM BY PATHOLOGIST: CPT | Performed by: PATHOLOGY

## 2019-09-16 PROCEDURE — 45380 COLONOSCOPY AND BIOPSY: CPT | Performed by: INTERNAL MEDICINE

## 2019-09-16 PROCEDURE — 81025 URINE PREGNANCY TEST: CPT | Performed by: ANESTHESIOLOGY

## 2019-09-16 PROCEDURE — NC001 PR NO CHARGE: Performed by: INTERNAL MEDICINE

## 2019-09-16 PROCEDURE — 43239 EGD BIOPSY SINGLE/MULTIPLE: CPT | Performed by: INTERNAL MEDICINE

## 2019-09-16 RX ORDER — PROPOFOL 10 MG/ML
INJECTION, EMULSION INTRAVENOUS AS NEEDED
Status: DISCONTINUED | OUTPATIENT
Start: 2019-09-16 | End: 2019-09-16 | Stop reason: SURG

## 2019-09-16 RX ORDER — PROPOFOL 10 MG/ML
INJECTION, EMULSION INTRAVENOUS CONTINUOUS PRN
Status: DISCONTINUED | OUTPATIENT
Start: 2019-09-16 | End: 2019-09-16 | Stop reason: SURG

## 2019-09-16 RX ORDER — SODIUM CHLORIDE, SODIUM LACTATE, POTASSIUM CHLORIDE, CALCIUM CHLORIDE 600; 310; 30; 20 MG/100ML; MG/100ML; MG/100ML; MG/100ML
125 INJECTION, SOLUTION INTRAVENOUS CONTINUOUS
Status: DISCONTINUED | OUTPATIENT
Start: 2019-09-16 | End: 2019-09-20 | Stop reason: HOSPADM

## 2019-09-16 RX ORDER — LIDOCAINE HYDROCHLORIDE 10 MG/ML
INJECTION, SOLUTION INFILTRATION; PERINEURAL AS NEEDED
Status: DISCONTINUED | OUTPATIENT
Start: 2019-09-16 | End: 2019-09-16 | Stop reason: SURG

## 2019-09-16 RX ORDER — ONDANSETRON 2 MG/ML
4 INJECTION INTRAMUSCULAR; INTRAVENOUS ONCE AS NEEDED
Status: DISCONTINUED | OUTPATIENT
Start: 2019-09-16 | End: 2019-09-20 | Stop reason: HOSPADM

## 2019-09-16 RX ORDER — PANTOPRAZOLE SODIUM 40 MG/1
40 TABLET, DELAYED RELEASE ORAL DAILY
Qty: 30 TABLET | Refills: 3 | Status: SHIPPED | OUTPATIENT
Start: 2019-09-16 | End: 2022-04-19 | Stop reason: ALTCHOICE

## 2019-09-16 RX ORDER — LIDOCAINE HYDROCHLORIDE 10 MG/ML
0.5 INJECTION, SOLUTION EPIDURAL; INFILTRATION; INTRACAUDAL; PERINEURAL ONCE AS NEEDED
Status: DISCONTINUED | OUTPATIENT
Start: 2019-09-16 | End: 2019-09-20 | Stop reason: HOSPADM

## 2019-09-16 RX ADMIN — PROPOFOL 250 MCG/KG/MIN: 10 INJECTION, EMULSION INTRAVENOUS at 10:24

## 2019-09-16 RX ADMIN — SODIUM CHLORIDE, SODIUM LACTATE, POTASSIUM CHLORIDE, AND CALCIUM CHLORIDE 125 ML/HR: .6; .31; .03; .02 INJECTION, SOLUTION INTRAVENOUS at 09:34

## 2019-09-16 RX ADMIN — LIDOCAINE HYDROCHLORIDE 50 MG: 10 INJECTION, SOLUTION INFILTRATION; PERINEURAL at 10:24

## 2019-09-16 RX ADMIN — PROPOFOL 200 MG: 10 INJECTION, EMULSION INTRAVENOUS at 10:24

## 2019-09-16 NOTE — ANESTHESIA POSTPROCEDURE EVALUATION
Post-Op Assessment Note    CV Status:  Stable  Pain Score: 0    Pain management: adequate     Mental Status:  Alert and awake   Hydration Status:  Euvolemic   PONV Controlled:  Controlled   Airway Patency:  Patent   Post Op Vitals Reviewed: Yes      Staff: Anesthesiologist           /55 (09/16/19 1056)    Temp     Pulse 81 (09/16/19 1056)   Resp 16 (09/16/19 1056)    SpO2 98 % (09/16/19 1056)

## 2019-09-16 NOTE — H&P
History and Physical -  [facility]  Foreign San 22 y o  female MRN: [id number]    HPI: Foreign San is a 22y o  year old female who presents with abdominal pain, diarrhea  Review of Systems    Historical Information   Past Medical History:   Diagnosis Date    Anxiety     History of transfusion     Age 4-as a child refused to eat,anemia    Irritable bowel syndrome     Ovarian cyst     LEFT     Wears glasses      Past Surgical History:   Procedure Laterality Date    PELVIC LAPAROSCOPY Left 6/13/2019    Procedure: LAPAROSCOPIC OVARIAN CYSTECTOMY;  Surgeon: Lorne Friday DO;  [facility];  Service: Gynecology    WISDOM TOOTH EXTRACTION       Social History   Social History     Substance and Sexual Activity   Alcohol Use Yes    Frequency: 2-4 times a month    Drinks per session: 1 or 2    Binge frequency: Never    Comment: Socially     Social History     Substance and Sexual Activity   Drug Use Never     Social History     Tobacco Use   Smoking Status Never Smoker   Smokeless Tobacco Never Used     Family History   Problem Relation Age of Onset    No Known Problems Mother     Hypertension Father     Hyperlipidemia Father     Other Brother         IBS,gluten allergy    No Known Problems Maternal Grandmother     No Known Problems Maternal Grandfather     Cancer Paternal Grandmother     No Known Problems Paternal Grandfather        Meds/Allergies       (Not in a hospital admission)    Allergies   Allergen Reactions    Amoxicillin GI Intolerance       Objective     /71   Pulse 76   Temp (!) 97 1 °F (36 2 °C) (Tympanic)   Resp 18   Ht 5' 2" (1 575 m)   Wt 61 7 kg (136 lb)   LMP 08/20/2019 (Within Days)   SpO2 100%   Breastfeeding?  No   BMI 24 87 kg/m²       PHYSICAL EXAM    Gen: NAD  CV: RRR  CHEST: Clear  ABD: soft, NT/ND  EXT: no edema  Neuro: AAO      ASSESSMENT/PLAN:  This is a 22y o  year old female here for abdominal pain, diarrhea         PLAN:   Procedure: egd/colonoscopy

## 2019-09-23 ENCOUNTER — TELEPHONE (OUTPATIENT)
Dept: GASTROENTEROLOGY | Facility: AMBULARY SURGERY CENTER | Age: 24
End: 2019-09-23

## 2019-09-23 DIAGNOSIS — K58.0 IRRITABLE BOWEL SYNDROME WITH DIARRHEA: Primary | ICD-10-CM

## 2019-09-23 PROBLEM — K58.9 IRRITABLE BOWEL SYNDROME: Status: ACTIVE | Noted: 2019-09-23

## 2019-09-23 NOTE — TELEPHONE ENCOUNTER
Patient called back informed her that all biopsies were normal,    Dr Yola Hinson that her diarrhea is due to IBS with Diarrhea  Told patient he sent over an antibiotic of  Rifaximin to pharmacy, once finished taking give us a call with update  Patient will also call back to schedule follow up appt

## 2019-09-23 NOTE — TELEPHONE ENCOUNTER
----- Message from Ann Rodriguez MD sent at 9/23/2019 12:56 PM EDT -----  Please inform the patient that all of her biopsies were normal, no evidence of celiac sprue, no evidence of H pylori, no evidence of microscopic colitis  I suspect that her diarrhea is due to irritable bowel syndrome with diarrhea  I have recommended trial of rifaximin 550 mg 3 times daily, I sent the prescription to pharmacy  Please have her call with any questions or concerns, please schedule office follow-up with the PA in 2 months time      Please have her call back with an update on her symptoms after she has completed antibiotics

## 2019-09-23 NOTE — TELEPHONE ENCOUNTER
----- Message from Chica Cintron MD sent at 9/23/2019 12:56 PM EDT -----  Please inform the patient that all of her biopsies were normal, no evidence of celiac sprue, no evidence of H pylori, no evidence of microscopic colitis  I suspect that her diarrhea is due to irritable bowel syndrome with diarrhea  I have recommended trial of rifaximin 550 mg 3 times daily, I sent the prescription to pharmacy  Please have her call with any questions or concerns, please schedule office follow-up with the PA in 2 months time      Please have her call back with an update on her symptoms after she has completed antibiotics

## 2019-09-24 ENCOUNTER — TELEPHONE (OUTPATIENT)
Dept: GASTROENTEROLOGY | Facility: AMBULARY SURGERY CENTER | Age: 24
End: 2019-09-24

## 2019-09-24 NOTE — TELEPHONE ENCOUNTER
----- Message from Caleb Carlos MD sent at 9/23/2019 12:56 PM EDT -----  Please inform the patient that all of her biopsies were normal, no evidence of celiac sprue, no evidence of H pylori, no evidence of microscopic colitis  I suspect that her diarrhea is due to irritable bowel syndrome with diarrhea  I have recommended trial of rifaximin 550 mg 3 times daily, I sent the prescription to pharmacy  Please have her call with any questions or concerns, please schedule office follow-up with the PA in 2 months time      Please have her call back with an update on her symptoms after she has completed antibiotics

## 2019-10-29 ENCOUNTER — TELEPHONE (OUTPATIENT)
Dept: GASTROENTEROLOGY | Facility: CLINIC | Age: 24
End: 2019-10-29

## 2019-10-29 NOTE — TELEPHONE ENCOUNTER
Left a message for patient to call back to discuss side effects she is experiencing from Pantoprazole

## 2019-10-29 NOTE — TELEPHONE ENCOUNTER
Patients GI provider:  Dr Judith Ruffin     Number to return call: [phone]    Reason for call: Pt calling to report side effects from the medication pantoprazole     Scheduled procedure/appointment date if applicable: Apt/procedure - n/a

## 2019-11-07 ENCOUNTER — ANNUAL EXAM (OUTPATIENT)
Dept: OBGYN CLINIC | Facility: CLINIC | Age: 24
End: 2019-11-07
Payer: COMMERCIAL

## 2019-11-07 VITALS
SYSTOLIC BLOOD PRESSURE: 102 MMHG | WEIGHT: 139 LBS | DIASTOLIC BLOOD PRESSURE: 60 MMHG | BODY MASS INDEX: 25.58 KG/M2 | HEIGHT: 62 IN

## 2019-11-07 DIAGNOSIS — Z30.41 ENCOUNTER FOR SURVEILLANCE OF CONTRACEPTIVE PILLS: ICD-10-CM

## 2019-11-07 DIAGNOSIS — Z01.419 WELL WOMAN EXAM WITH ROUTINE GYNECOLOGICAL EXAM: Primary | ICD-10-CM

## 2019-11-07 DIAGNOSIS — Z11.3 SCREENING FOR STDS (SEXUALLY TRANSMITTED DISEASES): ICD-10-CM

## 2019-11-07 DIAGNOSIS — B37.3 YEAST VAGINITIS: ICD-10-CM

## 2019-11-07 PROCEDURE — 87491 CHLMYD TRACH DNA AMP PROBE: CPT | Performed by: OBSTETRICS & GYNECOLOGY

## 2019-11-07 PROCEDURE — 87186 SC STD MICRODIL/AGAR DIL: CPT | Performed by: OBSTETRICS & GYNECOLOGY

## 2019-11-07 PROCEDURE — 87106 FUNGI IDENTIFICATION YEAST: CPT | Performed by: OBSTETRICS & GYNECOLOGY

## 2019-11-07 PROCEDURE — 99395 PREV VISIT EST AGE 18-39: CPT | Performed by: OBSTETRICS & GYNECOLOGY

## 2019-11-07 PROCEDURE — 87077 CULTURE AEROBIC IDENTIFY: CPT | Performed by: OBSTETRICS & GYNECOLOGY

## 2019-11-07 PROCEDURE — 87591 N.GONORRHOEAE DNA AMP PROB: CPT | Performed by: OBSTETRICS & GYNECOLOGY

## 2019-11-07 PROCEDURE — 87070 CULTURE OTHR SPECIMN AEROBIC: CPT | Performed by: OBSTETRICS & GYNECOLOGY

## 2019-11-07 RX ORDER — DROSPIRENONE, ETHINYL ESTRADIOL AND LEVOMEFOLATE CALCIUM AND LEVOMEFOLATE CALCIUM 3-0.02(24)
1 KIT ORAL
Qty: 84 TABLET | Refills: 4 | Status: SHIPPED | OUTPATIENT
Start: 2019-11-07 | End: 2020-11-18

## 2019-11-07 NOTE — PROGRESS NOTES
ASSESSMENT & PLAN: Marci Cooper is a 22 y o  Denise Jasmin with normal gynecologic exam     1   Routine well woman exam done today  2   Pap and HPV: Pap with reflex HPV was not today  Current ASCCP Guidelines reviewed  Pap due September 2020    3  The patient accepted STD testing  chlamydia, gonorrhea, HIV and syphillis, hepatitis testing performed  Safe sex practices have been discussed  4   Gardasil is recommended for patients from 526 years of age  The patient has had the Gardasil vaccine series  5  The patient is sexually active  She accepted contraception and options have been discussed  [employer] refills sent  6  Recurrent vaginitis: Improves with monistat but then recurs  Genital culture obtained today to r/o non-albicans yeast    7  The following were reviewed in today's visit: breast self exam, STD testing, family planning choices, exercise and healthy diet  8  Patient to return to office in 12 months for annual exam      All questions have been answered to her satisfaction  CC:  Annual Gynecologic Examination    HPI: Marci Cooper is a 22 y o  Denise Jasmin who presents for annual gynecologic examination  She has the following concerns:  Vaginal discharge and itching  States it occurs routinely after intercourse  Has only ever happened with her current partner  They do not use condoms or lubrications  They only have vaginal-penile penetration, no anal penetration  She does not douche  Her symptoms improve with OTC  Monistat 3 or 5 but then recur after next intercourse  Her partner occasionally has symptoms of swelling and discomfort but no discharge  Has not had STD testing since being with this partner  She is not currently having symptoms  Health Maintenance:    She exercises 4 days per week  She wears her seatbelt routinely  She does not perform regular monthly self breast exams  She feels safe at home  She does follow a well balanced diet      She does not use tobacco    Past Medical History:   Diagnosis Date    Anxiety     History of transfusion     Age 4-as a child refused to eat,anemia    Irritable bowel syndrome     Ovarian cyst     LEFT     Wears glasses        Past Surgical History:   Procedure Laterality Date    PELVIC LAPAROSCOPY Left 6/13/2019    Procedure: LAPAROSCOPIC OVARIAN CYSTECTOMY;  Surgeon: Glen Maddox DO;  [facility];  Service: Gynecology    WISDOM TOOTH EXTRACTION         Past OB/Gyn History:      Period Cycle (Days): 30  Period Duration (Days): 4-5  Period Pattern: Regular  Menstrual Flow: Heavy, Moderate  Dysmenorrhea: (!) Mild  Dysmenorrhea Symptoms: Cramping, HeadachePatient's last menstrual period was 10/22/2019  History of sexually transmitted infection No  Patient is currently sexually active  heterosexual Birth control: combination OCPs  Last Pap 9/2017 :  no abnormalities        Family History:  Family History   Problem Relation Age of Onset    No Known Problems Mother     Hypertension Father     Hyperlipidemia Father     Other Brother         IBS,gluten allergy    No Known Problems Maternal Grandmother     No Known Problems Maternal Grandfather     Cancer Paternal Grandmother     No Known Problems Paternal Grandfather        Social History:  Social History     Socioeconomic History    Marital status: Single     Spouse name: Not on file    Number of children: Not on file    Years of education: Not on file    Highest education level: Not on file   Occupational History     Employer: [employer]   Social Needs    Financial resource strain: Not on file    Food insecurity:     Worry: Not on file     Inability: Not on file    Transportation needs:     Medical: Not on file     Non-medical: Not on file   Tobacco Use    Smoking status: Never Smoker    Smokeless tobacco: Never Used   Substance and Sexual Activity    Alcohol use: Yes     Frequency: 2-4 times a month     Drinks per session: 1 or 2     Binge frequency: Never     Comment: Socially    Drug use: Never    Sexual activity: Yes     Partners: Male     Birth control/protection: Pill   Lifestyle    Physical activity:     Days per week: Not on file     Minutes per session: Not on file    Stress: Not on file   Relationships    Social connections:     Talks on phone: Not on file     Gets together: Not on file     Attends Jewish service: Not on file     Active member of club or organization: Not on file     Attends meetings of clubs or organizations: Not on file     Relationship status: Not on file    Intimate partner violence:     Fear of current or ex partner: Not on file     Emotionally abused: Not on file     Physically abused: Not on file     Forced sexual activity: Not on file   Other Topics Concern    Not on file   Social History Narrative    Brushes teeth daily; regular dental care    Lives with parents/grandparents    Sleeps 8-10 hours a day     Presently lives with her parents  Patient is single  Patient is currently employed in   Allergies   Allergen Reactions    Amoxicillin GI Intolerance       Current Outpatient Medications:     Drospiren-Eth Estrad-Levomefol 3-0 02-0 451 MG TABS, Take 1 tablet by mouth daily (Patient taking differently: Take 1 tablet by mouth daily at bedtime ), Disp: 84 tablet, Rfl: 3    pantoprazole (PROTONIX) 40 mg tablet, Take 1 tablet (40 mg total) by mouth daily, Disp: 30 tablet, Rfl: 3    Review of Systems:  Denies fevers, chills, unintentional weight loss, excessive fatigue, chest pain, shortness of breath, abdominal pain, nausea, vomiting, urinary incontinence, urinary frequency, vaginal bleeding  All other systems negative unless otherwise stated  Physical Exam:  /60   Ht 5' 2" (1 575 m)   Wt 63 kg (139 lb)   LMP 10/22/2019   BMI 25 42 kg/m²  Body mass index is 25 42 kg/m²  GEN: The patient was alert and oriented x3, pleasant well-appearing female in no acute distress  HEENT:  Unremarkable, no anterior or posterior lymphadenopathy, no thyromegaly  CV:  Regular rate and rhythm, normal S1 and S2, no murmurs  RESP:  Clear to auscultation bilaterally, no wheezes, rales or rhonchi  BREAST:  Symmetric breasts with no palpable breast masses or obvious breast lesions  She has no retractions or nipple discharge  She has no axillary abnormalities or palpable masses  GI:  Soft, nontender, non-distended  MSK: bilateral lower extremities are nontender, no edema  : Normal appearing external female genitalia, normal appearing urethral meatus  On sterile speculum exam, normal appearing vaginal epithelium, no vaginal discharge, no bleeding, grossly normal appearing cervix  On bimanual exam, no cervical motion tenderness; uterus is smooth, mobile, nontender 6 weeks size  No tenderness or fullness in the bilateral adnexa

## 2019-11-09 LAB
C TRACH DNA SPEC QL NAA+PROBE: NEGATIVE
N GONORRHOEA DNA SPEC QL NAA+PROBE: NEGATIVE

## 2019-11-10 LAB
BACTERIA GENITAL AEROBE CULT: ABNORMAL

## 2019-11-11 DIAGNOSIS — N76.0 ACUTE VAGINITIS: Primary | ICD-10-CM

## 2019-11-11 RX ORDER — FLUCONAZOLE 150 MG/1
150 TABLET ORAL ONCE
Qty: 1 TABLET | Refills: 1 | Status: SHIPPED | OUTPATIENT
Start: 2019-11-11 | End: 2019-11-11

## 2019-11-11 RX ORDER — SULFAMETHOXAZOLE AND TRIMETHOPRIM 800; 160 MG/1; MG/1
1 TABLET ORAL EVERY 12 HOURS SCHEDULED
Qty: 14 TABLET | Refills: 0 | Status: SHIPPED | OUTPATIENT
Start: 2019-11-11 | End: 2019-11-18

## 2020-02-03 ENCOUNTER — TELEPHONE (OUTPATIENT)
Dept: OBGYN CLINIC | Facility: CLINIC | Age: 25
End: 2020-02-03

## 2020-02-03 DIAGNOSIS — B37.3 YEAST VAGINITIS: Primary | ICD-10-CM

## 2020-02-03 RX ORDER — FLUCONAZOLE 150 MG/1
150 TABLET ORAL ONCE
Qty: 1 TABLET | Refills: 0 | Status: SHIPPED | OUTPATIENT
Start: 2020-02-03 | End: 2020-02-03

## 2020-02-03 NOTE — TELEPHONE ENCOUNTER
Patient called stating she is having symptoms of another yeast infection  Discussed with Dr Chetan Pineda script to pharmacy for patient 
not applicable

## 2020-02-25 ENCOUNTER — OFFICE VISIT (OUTPATIENT)
Dept: OBGYN CLINIC | Facility: CLINIC | Age: 25
End: 2020-02-25
Payer: COMMERCIAL

## 2020-02-25 VITALS — SYSTOLIC BLOOD PRESSURE: 106 MMHG | BODY MASS INDEX: 26.16 KG/M2 | WEIGHT: 143 LBS | DIASTOLIC BLOOD PRESSURE: 70 MMHG

## 2020-02-25 DIAGNOSIS — B37.3 YEAST VAGINITIS: Primary | ICD-10-CM

## 2020-02-25 PROCEDURE — 1036F TOBACCO NON-USER: CPT | Performed by: OBSTETRICS & GYNECOLOGY

## 2020-02-25 PROCEDURE — 99213 OFFICE O/P EST LOW 20 MIN: CPT | Performed by: OBSTETRICS & GYNECOLOGY

## 2020-02-25 RX ORDER — FLUCONAZOLE 150 MG/1
150 TABLET ORAL SEE ADMIN INSTRUCTIONS
Qty: 8 TABLET | Refills: 6 | Status: SHIPPED | OUTPATIENT
Start: 2020-02-25 | End: 2020-08-25

## 2020-02-25 NOTE — PROGRESS NOTES
Assessment/Plan     Recurrent yeast vaginitis  1  Wet prep was obtained  2  Cultures for gonorrhea and chlamydia were not collected  3  Patient was treated with extended course of terazol and PO fluconazole x 6 months maintenance  CHIEF COMPLAINT: Vaginal itching    Subjective     Giuseppe Gordon is a 22 y o  female who presents for evaluation of recurrent yeast vaginitis  The patient has had several episodes of vaginal itching and thick, white vaginal discharge  She has been treated several times by our office for yeast vaginitis  She improves with each treatment and then about 1-2 weeks afterwards her symptoms return  She denies new partners, new STD exposures, abnormal vaginal bleeding, vaginal odor, pain  ROS:   She denies hematuria, dysuria, constipation, diarrhea, fevers, chills, nausea or emesis  Patient Active Problem List   Diagnosis    Abdominal pain    Diarrhea    Elevated LFTs    Irritable bowel syndrome       The following portions of the patient's history were reviewed and updated as appropriate: allergies, current medications, past family history, past medical history, past social history and past surgical history  /70   Wt 64 9 kg (143 lb)   LMP 02/13/2020   Breastfeeding No   BMI 26 16 kg/m²     GEN: The patient was alert and oriented x3, pleasant well-appearing female in no acute distress  Pelvic: Normal appearing external female genitalia, Inflammed appearing vaginal epithelium, normalappearing cervix  positive discharge noted - thick, white, slong the vaginal side wall  Wet Prep: Clue cells negative, Hyphae positive, Trichomonas negative  Whiff Test was performed and was negative   PH: 5

## 2020-07-02 ENCOUNTER — TELEMEDICINE (OUTPATIENT)
Dept: FAMILY MEDICINE CLINIC | Facility: CLINIC | Age: 25
End: 2020-07-02
Payer: COMMERCIAL

## 2020-07-02 VITALS — BODY MASS INDEX: 25.79 KG/M2 | WEIGHT: 141 LBS

## 2020-07-02 DIAGNOSIS — Z20.828 EXPOSURE TO SARS-ASSOCIATED CORONAVIRUS: ICD-10-CM

## 2020-07-02 DIAGNOSIS — B34.9 VIRAL ILLNESS: Primary | ICD-10-CM

## 2020-07-02 PROCEDURE — U0003 INFECTIOUS AGENT DETECTION BY NUCLEIC ACID (DNA OR RNA); SEVERE ACUTE RESPIRATORY SYNDROME CORONAVIRUS 2 (SARS-COV-2) (CORONAVIRUS DISEASE [COVID-19]), AMPLIFIED PROBE TECHNIQUE, MAKING USE OF HIGH THROUGHPUT TECHNOLOGIES AS DESCRIBED BY CMS-2020-01-R: HCPCS | Performed by: OBSTETRICS & GYNECOLOGY

## 2020-07-02 PROCEDURE — 99213 OFFICE O/P EST LOW 20 MIN: CPT | Performed by: FAMILY MEDICINE

## 2020-07-02 PROCEDURE — 1036F TOBACCO NON-USER: CPT | Performed by: FAMILY MEDICINE

## 2020-07-02 NOTE — PROGRESS NOTES
Virtual Regular Visit      Assessment/Plan:    Problem List Items Addressed This Visit     None      Visit Diagnoses     Viral illness    -  Primary    supportive care  hydration and rest     Exposure to SARS-associated coronavirus        Relevant Orders    MISC COVID-19 TEST- Collected at [facility]          BMI Counseling: Body mass index is 25 79 kg/m²  The BMI is above normal  Nutrition recommendations include decreasing portion sizes and encouraging healthy choices of fruits and vegetables  Exercise recommendations include moderate physical activity 150 minutes/week  No pharmacotherapy was ordered  Reason for visit is   Chief Complaint   Patient presents with    Virtual Regular Visit        Encounter provider Tiana Lugo MD    Provider located at [address] [address]      Recent Visits  No visits were found meeting these conditions  Showing recent visits within past 7 days and meeting all other requirements     Today's Visits  Date Type Provider Dept   07/02/20 Telemedicine Tiana Lugo MD [id number] [facility] today's visits and meeting all other requirements     Future Appointments  No visits were found meeting these conditions  Showing future appointments within next 150 days and meeting all other requirements        The patient was identified by name and date of birth  Kelsey Villafana was informed that this is a telemedicine visit and that the visit is being conducted through [facility] and patient was informed that this is a secure, HIPAA-compliant platform  She agrees to proceed     My office door was closed  No one else was in the room  She acknowledged consent and understanding of privacy and security of the video platform  The patient has agreed to participate and understands they can discontinue the visit at any time  Patient is aware this is a billable service       Subjective Nadara Lefort Ruthann Gomez is a 22 y o  female for possible covid   HPI   Went down to [place] 2 weeks ago and came back   C/o headaches, congestion and sweating and feeling warm for 2 days   + shortness of breath on exertion today  + diarrhea x 2 days ago , better today  No loss of sense of smell or taste   No shaking chills   No exposure to anyone with covid 19      Past Medical History:   Diagnosis Date    Anxiety     History of transfusion     Age 4-as a child refused to eat,anemia    Irritable bowel syndrome     Ovarian cyst     LEFT     Wears glasses        Past Surgical History:   Procedure Laterality Date    PELVIC LAPAROSCOPY Left 6/13/2019    Procedure: LAPAROSCOPIC OVARIAN CYSTECTOMY;  Surgeon: Carmen Garces DO;  [facility];  Service: Gynecology    WISDOM TOOTH EXTRACTION         Current Outpatient Medications   Medication Sig Dispense Refill    Drospiren-Eth Estrad-Levomefol 3-0 02-0 451 MG TABS Take 1 tablet by mouth daily at bedtime 84 tablet 4    fluconazole (DIFLUCAN) 150 mg tablet Take 1 tablet (150 mg total) by mouth see administration instructions Take 1 tablet on day 1, 4 and 7 with the terconazole  Then take 1 tablet once weekly for 6 months  8 tablet 6    pantoprazole (PROTONIX) 40 mg tablet Take 1 tablet (40 mg total) by mouth daily 30 tablet 3    terconazole (TERAZOL 7) 0 4 % vaginal cream Insert 1 applicator into the vagina daily at bedtime 45 g 0     No current facility-administered medications for this visit  Allergies   Allergen Reactions    Amoxicillin GI Intolerance       Review of Systems   Constitutional: Positive for fatigue  Negative for activity change and fever  HENT: Positive for congestion and sinus pain  Eyes: Negative  Respiratory: Positive for shortness of breath  Negative for cough  Cardiovascular: Negative  Gastrointestinal: Negative  Genitourinary: Negative  Musculoskeletal: Negative  Skin: Negative for color change and rash  Allergic/Immunologic: Negative  Neurological: Negative  Video Exam    Vitals:    07/02/20 1130   Weight: 64 kg (141 lb)       Physical Exam   Constitutional: She is oriented to person, place, and time  She appears well-developed and well-nourished  Neck: Normal range of motion  Pulmonary/Chest: Effort normal  No respiratory distress  Neurological: She is alert and oriented to person, place, and time  Psychiatric: She has a normal mood and affect  Her behavior is normal         As a result of this visit, I have not referred the patient for further respiratory evaluation  I spent 15 minutes directly with the patient during this visit      Gene Souza Raymond acknowledges that she has consented to an online visit or consultation  She understands that the online visit is based solely on information provided by her, and that, in the absence of a face-to-face physical evaluation by the physician, the diagnosis she receives is both limited and provisional in terms of accuracy and completeness  This is not intended to replace a full medical face-to-face evaluation by the physician  [facility] understands and accepts these terms

## 2020-07-09 LAB — SARS-COV-2 RNA SPEC QL NAA+PROBE: DETECTED

## 2020-07-10 ENCOUNTER — TELEMEDICINE (OUTPATIENT)
Dept: FAMILY MEDICINE CLINIC | Facility: CLINIC | Age: 25
End: 2020-07-10
Payer: COMMERCIAL

## 2020-07-10 VITALS — HEIGHT: 62 IN | WEIGHT: 141 LBS | BODY MASS INDEX: 25.95 KG/M2 | TEMPERATURE: 98 F

## 2020-07-10 DIAGNOSIS — U07.1 COVID-19 VIRUS INFECTION: Primary | ICD-10-CM

## 2020-07-10 PROCEDURE — 99213 OFFICE O/P EST LOW 20 MIN: CPT | Performed by: FAMILY MEDICINE

## 2020-07-10 PROCEDURE — 1036F TOBACCO NON-USER: CPT | Performed by: FAMILY MEDICINE

## 2020-07-10 PROCEDURE — 3008F BODY MASS INDEX DOCD: CPT | Performed by: FAMILY MEDICINE

## 2020-07-10 NOTE — PROGRESS NOTES
Virtual Regular Visit      Assessment/Plan:    Problem List Items Addressed This Visit     None      Visit Diagnoses     COVID-19 virus infection    -  Primary    improving   cleared medically                  Reason for visit is   Chief Complaint   Patient presents with    Virtual Regular Visit        Encounter provider Joseluis Loco MD    Provider located at [address] [address]      Recent Visits  No visits were found meeting these conditions  Showing recent visits within past 7 days and meeting all other requirements     Today's Visits  Date Type Provider Dept   07/10/20 Telemedicine Joseluis Loco MD [address] today's visits and meeting all other requirements     Future Appointments  No visits were found meeting these conditions  Showing future appointments within next 150 days and meeting all other requirements        The patient was identified by name and date of birth  Cathi Darden was informed that this is a telemedicine visit and that the visit is being conducted through [facility] and patient was informed that this is a secure, HIPAA-compliant platform  She agrees to proceed     My office door was closed  No one else was in the room  She acknowledged consent and understanding of privacy and security of the video platform  The patient has agreed to participate and understands they can discontinue the visit at any time  Patient is aware this is a billable service  Subjective  Cathi Darden is a 22 y o  female  For covid follow up         HPI   Symptoms of headaches, congestion and sweating along with feeling warm started 7/1/20  Was in [place] 2 weeks prior   covid 19 Positive 7/2/20   Day #7 from the onset of her symptoms   Feeling better today   Her tightness of her chest has been improving   No shortness of breath/cough           Past Medical History:   Diagnosis Date    Anxiety     History of transfusion     Age 4-as a child refused to eat,anemia    Irritable bowel syndrome     Ovarian cyst     LEFT     Wears glasses        Past Surgical History:   Procedure Laterality Date    PELVIC LAPAROSCOPY Left 6/13/2019    Procedure: LAPAROSCOPIC OVARIAN CYSTECTOMY;  Surgeon: Abilio Preston DO;  [facility];  Service: Gynecology    WISDOM TOOTH EXTRACTION         Current Outpatient Medications   Medication Sig Dispense Refill    Drospiren-Eth Estrad-Levomefol 3-0 02-0 451 MG TABS Take 1 tablet by mouth daily at bedtime 84 tablet 4    fluconazole (DIFLUCAN) 150 mg tablet Take 1 tablet (150 mg total) by mouth see administration instructions Take 1 tablet on day 1, 4 and 7 with the terconazole  Then take 1 tablet once weekly for 6 months  8 tablet 6    pantoprazole (PROTONIX) 40 mg tablet Take 1 tablet (40 mg total) by mouth daily 30 tablet 3    terconazole (TERAZOL 7) 0 4 % vaginal cream Insert 1 applicator into the vagina daily at bedtime 45 g 0     No current facility-administered medications for this visit  Allergies   Allergen Reactions    Amoxicillin GI Intolerance       Review of Systems   Constitutional: Negative  HENT: Negative  Eyes: Negative  Respiratory: Negative  Cardiovascular: Negative  Gastrointestinal: Negative  Genitourinary: Negative  Musculoskeletal: Negative  Video Exam    Vitals:    07/10/20 1130   Temp: 98 °F (36 7 °C)   Weight: 64 kg (141 lb)   Height: 5' 2" (1 575 m)       Physical Exam   Constitutional: She is oriented to person, place, and time  She appears well-developed and well-nourished  Pulmonary/Chest: Effort normal    Neurological: She is alert and oriented to person, place, and time  Skin: No erythema  Psychiatric: She has a normal mood and affect   Her behavior is normal         I spent 15 minutes directly with the patient during this visit      Gene Andrade acknowledges that she has consented to an online visit or consultation  She understands that the online visit is based solely on information provided by her, and that, in the absence of a face-to-face physical evaluation by the physician, the diagnosis she receives is both limited and provisional in terms of accuracy and completeness  This is not intended to replace a full medical face-to-face evaluation by the physician  BOBBY ARORA [facility] understands and accepts these terms

## 2020-10-12 ENCOUNTER — OFFICE VISIT (OUTPATIENT)
Dept: FAMILY MEDICINE CLINIC | Facility: CLINIC | Age: 25
End: 2020-10-12
Payer: COMMERCIAL

## 2020-10-12 VITALS
BODY MASS INDEX: 27.09 KG/M2 | SYSTOLIC BLOOD PRESSURE: 114 MMHG | WEIGHT: 147.2 LBS | HEART RATE: 82 BPM | TEMPERATURE: 99 F | RESPIRATION RATE: 14 BRPM | HEIGHT: 62 IN | DIASTOLIC BLOOD PRESSURE: 70 MMHG | OXYGEN SATURATION: 99 %

## 2020-10-12 DIAGNOSIS — K58.0 IRRITABLE BOWEL SYNDROME WITH DIARRHEA: ICD-10-CM

## 2020-10-12 DIAGNOSIS — G43.709 CHRONIC MIGRAINE WITHOUT AURA WITHOUT STATUS MIGRAINOSUS, NOT INTRACTABLE: Primary | ICD-10-CM

## 2020-10-12 PROCEDURE — 90471 IMMUNIZATION ADMIN: CPT | Performed by: FAMILY MEDICINE

## 2020-10-12 PROCEDURE — 90686 IIV4 VACC NO PRSV 0.5 ML IM: CPT | Performed by: FAMILY MEDICINE

## 2020-10-12 PROCEDURE — 99214 OFFICE O/P EST MOD 30 MIN: CPT | Performed by: FAMILY MEDICINE

## 2020-10-12 PROCEDURE — 90715 TDAP VACCINE 7 YRS/> IM: CPT | Performed by: FAMILY MEDICINE

## 2020-10-12 PROCEDURE — 1036F TOBACCO NON-USER: CPT | Performed by: FAMILY MEDICINE

## 2020-10-12 PROCEDURE — 90472 IMMUNIZATION ADMIN EACH ADD: CPT | Performed by: FAMILY MEDICINE

## 2020-10-12 RX ORDER — RIZATRIPTAN BENZOATE 10 MG/1
10 TABLET ORAL ONCE AS NEEDED
Qty: 9 TABLET | Refills: 0 | Status: SHIPPED | OUTPATIENT
Start: 2020-10-12 | End: 2021-02-04 | Stop reason: SDUPTHER

## 2020-10-12 RX ORDER — UREA 10 %
500 LOTION (ML) TOPICAL DAILY
Start: 2020-10-12 | End: 2022-04-19 | Stop reason: ALTCHOICE

## 2020-11-18 DIAGNOSIS — Z30.41 ENCOUNTER FOR SURVEILLANCE OF CONTRACEPTIVE PILLS: ICD-10-CM

## 2020-11-18 RX ORDER — DROSPIRENONE, ETHINYL ESTRADIOL AND LEVOMEFOLATE CALCIUM AND LEVOMEFOLATE CALCIUM 3-0.02(24)
KIT ORAL
Qty: 84 TABLET | Refills: 0 | Status: SHIPPED | OUTPATIENT
Start: 2020-11-18 | End: 2021-01-07 | Stop reason: SDUPTHER

## 2021-01-07 DIAGNOSIS — Z30.41 ENCOUNTER FOR SURVEILLANCE OF CONTRACEPTIVE PILLS: ICD-10-CM

## 2021-01-07 RX ORDER — DROSPIRENONE, ETHINYL ESTRADIOL AND LEVOMEFOLATE CALCIUM AND LEVOMEFOLATE CALCIUM 3-0.02(24)
1 KIT ORAL
Qty: 84 TABLET | Refills: 0 | Status: SHIPPED | OUTPATIENT
Start: 2021-01-07 | End: 2021-02-25 | Stop reason: SDUPTHER

## 2021-01-07 NOTE — TELEPHONE ENCOUNTER
Pt's appt was cancelled this afternoon and she was rescheduled to the end of February  Can you please send one more refill for Pt until that time?

## 2021-02-04 ENCOUNTER — TELEMEDICINE (OUTPATIENT)
Dept: FAMILY MEDICINE CLINIC | Facility: CLINIC | Age: 26
End: 2021-02-04
Payer: COMMERCIAL

## 2021-02-04 VITALS — BODY MASS INDEX: 26.5 KG/M2 | HEIGHT: 62 IN | WEIGHT: 144 LBS

## 2021-02-04 DIAGNOSIS — G43.709 CHRONIC MIGRAINE WITHOUT AURA WITHOUT STATUS MIGRAINOSUS, NOT INTRACTABLE: ICD-10-CM

## 2021-02-04 DIAGNOSIS — M54.2 NECK PAIN: Primary | ICD-10-CM

## 2021-02-04 PROCEDURE — 99214 OFFICE O/P EST MOD 30 MIN: CPT | Performed by: FAMILY MEDICINE

## 2021-02-04 RX ORDER — RIZATRIPTAN BENZOATE 10 MG/1
10 TABLET ORAL ONCE AS NEEDED
Qty: 9 TABLET | Refills: 0 | Status: SHIPPED | OUTPATIENT
Start: 2021-02-04

## 2021-02-04 RX ORDER — CYCLOBENZAPRINE HCL 10 MG
10 TABLET ORAL
Qty: 30 TABLET | Refills: 0 | Status: SHIPPED | OUTPATIENT
Start: 2021-02-04 | End: 2022-04-19 | Stop reason: ALTCHOICE

## 2021-02-04 NOTE — PROGRESS NOTES
Virtual Regular Visit      Assessment/Plan:    Problem List Items Addressed This Visit     None      Visit Diagnoses     Neck pain    -  Primary    Relevant Medications    cyclobenzaprine (FLEXERIL) 10 mg tablet    Chronic migraine without aura without status migrainosus, not intractable        headaches diary   to avoid triggers  added muscle relaxer  to look into FMLA for work     Relevant Medications    rizatriptan (MAXALT) 10 MG tablet    cyclobenzaprine (FLEXERIL) 10 mg tablet               Reason for visit is   Chief Complaint   Patient presents with    Virtual Regular Visit    Virtual Regular Visit        Encounter provider Jose Guzman MD    Provider located at [address] [address]      Recent Visits  No visits were found meeting these conditions  Showing recent visits within past 7 days and meeting all other requirements     Today's Visits  Date Type Provider Dept   02/04/21 Telemedicine Jose Guzman MD [address] [facility] today's visits and meeting all other requirements     Future Appointments  No visits were found meeting these conditions  Showing future appointments within next 150 days and meeting all other requirements        The patient was identified by name and date of birth  Marvin Cockayne was informed that this is a telemedicine visit and that the visit is being conducted through [facility] and patient was informed that this is a secure, HIPAA-compliant platform  She agrees to proceed     My office door was closed  No one else was in the room  She acknowledged consent and understanding of privacy and security of the video platform  The patient has agreed to participate and understands they can discontinue the visit at any time  Patient is aware this is a billable service  Subjective  Marvin Cockayne is a 26 y o  female for frequent headaches     Gets them 2 x a month around her periods   Lasting 1 day   maxalt helped  +neck pain with the headaches  Patient is frustrated that she has to call out of work for this       Headache   This is a chronic problem  The current episode started more than 1 year ago  The problem occurs intermittently  The problem has been waxing and waning  The pain is located in the right unilateral region  The pain radiates to the right neck  The pain quality is similar to prior headaches  The pain is at a severity of 3/10  The pain is mild  Associated symptoms include muscle aches, nausea and neck pain  Pertinent negatives include no abdominal pain, abnormal behavior, anorexia, back pain, blurred vision, coughing, dizziness, drainage, ear pain, eye pain, eye redness, facial sweating, fever, hearing loss, insomnia, loss of balance, numbness, phonophobia, photophobia, rhinorrhea, scalp tenderness, seizures, sinus pressure, sore throat, swollen glands, tingling, tinnitus, visual change, vomiting, weakness or weight loss  She has tried triptans for the symptoms  The treatment provided mild relief  Her past medical history is significant for migraine headaches  There is no history of cancer, cluster headaches or hypertension              Past Medical History:   Diagnosis Date    Anxiety     History of transfusion     Age 4-as a child refused to eat,anemia    Irritable bowel syndrome     Ovarian cyst     LEFT     Wears glasses        Past Surgical History:   Procedure Laterality Date    PELVIC LAPAROSCOPY Left 6/13/2019    Procedure: LAPAROSCOPIC OVARIAN CYSTECTOMY;  Surgeon: Yamilex Elkins DO;  [facility];  Service: Gynecology    WISDOM TOOTH EXTRACTION         Current Outpatient Medications   Medication Sig Dispense Refill    Drospiren-Eth Estrad-Levomefol 3-0 02-0 451 MG TABS Take 1 tablet by mouth daily at bedtime 84 tablet 0    magnesium gluconate (MAGONATE) 500 mg tablet Take 1 tablet (500 mg total) by mouth daily      pantoprazole (PROTONIX) 40 mg tablet Take 1 tablet (40 mg total) by mouth daily 30 tablet 3    rizatriptan (MAXALT) 10 MG tablet Take 1 tablet (10 mg total) by mouth once as needed for migraine for up to 1 dose May repeat in 2 hours if needed 9 tablet 0    cyclobenzaprine (FLEXERIL) 10 mg tablet Take 1 tablet (10 mg total) by mouth daily at bedtime 30 tablet 0     No current facility-administered medications for this visit  Allergies   Allergen Reactions    Amoxicillin GI Intolerance       Review of Systems   Constitutional: Negative  Negative for fever and weight loss  HENT: Negative  Negative for ear pain, hearing loss, rhinorrhea, sinus pressure, sore throat and tinnitus  Eyes: Negative for blurred vision, photophobia, pain and redness  Respiratory: Negative  Negative for cough  Cardiovascular: Negative  Gastrointestinal: Positive for nausea  Negative for abdominal pain, anorexia and vomiting  Musculoskeletal: Positive for neck pain  Negative for back pain  Neurological: Positive for headaches  Negative for dizziness, tingling, seizures, weakness, numbness and loss of balance  Hematological: Negative  Psychiatric/Behavioral: Negative  The patient does not have insomnia  Video Exam    Vitals:    02/04/21 1625   Weight: 65 3 kg (144 lb)   Height: 5' 2" (1 575 m)       Physical Exam  Constitutional:       Appearance: Normal appearance  Pulmonary:      Effort: Pulmonary effort is normal  No respiratory distress  Neurological:      General: No focal deficit present  Mental Status: She is alert and oriented to person, place, and time  Psychiatric:         Mood and Affect: Mood normal           I spent 20 minutes directly with the patient during this visit      Gene S  Harley Andrade acknowledges that she has consented to an online visit or consultation   She understands that the online visit is based solely on information provided by her, and that, in the absence of a face-to-face physical evaluation by the physician, the diagnosis she receives is both limited and provisional in terms of accuracy and completeness  This is not intended to replace a full medical face-to-face evaluation by the physician  BOBBY CANDELARIA understands and accepts these terms

## 2021-02-05 ENCOUNTER — OFFICE VISIT (OUTPATIENT)
Dept: OBGYN CLINIC | Facility: CLINIC | Age: 26
End: 2021-02-05
Payer: COMMERCIAL

## 2021-02-05 VITALS — DIASTOLIC BLOOD PRESSURE: 68 MMHG | BODY MASS INDEX: 26.89 KG/M2 | WEIGHT: 147 LBS | SYSTOLIC BLOOD PRESSURE: 116 MMHG

## 2021-02-05 DIAGNOSIS — B37.3 YEAST VAGINITIS: Primary | ICD-10-CM

## 2021-02-05 PROCEDURE — 99213 OFFICE O/P EST LOW 20 MIN: CPT | Performed by: OBSTETRICS & GYNECOLOGY

## 2021-02-05 NOTE — PROGRESS NOTES
Assessment/Plan     Will try course of terconazole  If recurrent symptoms will go back on weekly diflucan  Can take daily probiotic if desired  CHIEF COMPLAINT: vaginal discharge      Rosario Christian is a 26 y o  female who presents for evaluation of an abnormal vaginal discharge  Symptoms have been present for 1 week  Vaginal symptoms: discharge described as white, creamy and curd-like, local irritation and vulvar itching  Contraception: OCP (estrogen/progesterone)  She denies abnormal bleeding and burning Sexually transmitted infection risk: very low risk of STD exposure  ROS:   She denies hematuria, dysuria, constipation, diarrhea, fevers, chills, nausea or emesis  Patient Active Problem List   Diagnosis    Elevated LFTs    Irritable bowel syndrome       The following portions of the patient's history were reviewed and updated as appropriate: allergies, current medications, past family history, past medical history, past social history, past surgical history and problem list     /68   Wt 66 7 kg (147 lb)   LMP 01/14/2021 (Exact Date)   Breastfeeding No   BMI 26 89 kg/m²     GEN: The patient was alert and oriented x3, pleasant well-appearing female in no acute distress  Pelvic: Normal appearing external female genitalia, normal vaginal epithelium, normalappearing cervix  positive discharge noted        Wet Prep: Clue cells negative, Hyphae positive, Trichomonas negative   PH: 5

## 2021-02-25 ENCOUNTER — ANNUAL EXAM (OUTPATIENT)
Dept: OBGYN CLINIC | Facility: CLINIC | Age: 26
End: 2021-02-25
Payer: COMMERCIAL

## 2021-02-25 VITALS
HEIGHT: 63 IN | BODY MASS INDEX: 26.15 KG/M2 | SYSTOLIC BLOOD PRESSURE: 108 MMHG | DIASTOLIC BLOOD PRESSURE: 70 MMHG | WEIGHT: 147.6 LBS

## 2021-02-25 DIAGNOSIS — Z01.419 WELL FEMALE EXAM WITH ROUTINE GYNECOLOGICAL EXAM: Primary | ICD-10-CM

## 2021-02-25 DIAGNOSIS — Z12.4 SCREENING FOR MALIGNANT NEOPLASM OF CERVIX: ICD-10-CM

## 2021-02-25 DIAGNOSIS — Z30.41 ENCOUNTER FOR SURVEILLANCE OF CONTRACEPTIVE PILLS: ICD-10-CM

## 2021-02-25 PROBLEM — R79.89 ELEVATED LFTS: Status: RESOLVED | Noted: 2019-03-27 | Resolved: 2021-02-25

## 2021-02-25 PROCEDURE — 1036F TOBACCO NON-USER: CPT | Performed by: OBSTETRICS & GYNECOLOGY

## 2021-02-25 PROCEDURE — 99395 PREV VISIT EST AGE 18-39: CPT | Performed by: OBSTETRICS & GYNECOLOGY

## 2021-02-25 PROCEDURE — G0145 SCR C/V CYTO,THINLAYER,RESCR: HCPCS | Performed by: OBSTETRICS & GYNECOLOGY

## 2021-02-25 PROCEDURE — 3008F BODY MASS INDEX DOCD: CPT | Performed by: OBSTETRICS & GYNECOLOGY

## 2021-02-25 RX ORDER — DROSPIRENONE, ETHINYL ESTRADIOL AND LEVOMEFOLATE CALCIUM AND LEVOMEFOLATE CALCIUM 3-0.02(24)
1 KIT ORAL
Qty: 84 TABLET | Refills: 4 | Status: SHIPPED | OUTPATIENT
Start: 2021-02-25 | End: 2022-03-20

## 2021-02-25 NOTE — PROGRESS NOTES
ASSESSMENT & PLAN: Homa Rai is a 26 y o  Randolph Hinckley with normal gynecologic exam     1   Routine well woman exam done today  2    Pap and HPV: Pap with reflex HPV was done today  Current ASCCP Guidelines reviewed  3   The patient declined STD testing  Safe sex practices have been discussed  4   Gardasil is recommended for patients from 526 years of age  The patient has had the Gardasil vaccine series  5  The patient is sexually active  She accepted contraception and options have been discussed  CHCs refilled, will take them continuously to see if avoiding placebos helps with migraines  If not may want to consider switching to non-hormonal contraceptive  6  The following were reviewed in today's visit: breast self exam, STD testing, use and side effects of OCPs, exercise and healthy diet  7  Patient to return to office in 12 months for annual exam      All questions have been answered to her satisfaction  CC:  Annual Gynecologic Examination    HPI: Homa Rai is a 26 y o  Randolphgail Finney who presents for annual gynecologic examination  She has the following concerns:  Having migraines during her menstrual period which have been worsening recently  Health Maintenance:    She exercises 3 days per week  She wears her seatbelt routinely  She does perform regular monthly self breast exams  She feels safe at home  She does follow a well balanced diet      She does not use tobacco    Past Medical History:   Diagnosis Date    Anxiety     History of transfusion     Age 4-as a child refused to eat,anemia    Irritable bowel syndrome     Migraine 01/01/2010    Have always had them but have increased in the last three mo    Ovarian cyst     LEFT     Wears glasses        Past Surgical History:   Procedure Laterality Date    PELVIC LAPAROSCOPY Left 6/13/2019    Procedure: LAPAROSCOPIC OVARIAN CYSTECTOMY;  Surgeon: Kait Schmidt DO;  [facility];  Service: Gynecology    WISDOM TOOTH EXTRACTION         Past OB/Gyn History:      Period Cycle (Days): 28  Period Duration (Days): 4-5  Period Pattern: Regular  Menstrual Flow: Moderate  Menstrual Control: Maxi pad, Tampon  Menstrual Control Change Freq (Hours): 4  Dysmenorrhea: (!) MildPatient's last menstrual period was 02/11/2021  Patient is currently sexually active  heterosexual Birth control: combination OCPs  Last Pap 9/2017 :  no abnormalities  Family History:  Family History   Problem Relation Age of Onset    No Known Problems Mother     Hypertension Father     Hyperlipidemia Father     Irritable bowel syndrome Brother     No Known Problems Maternal Grandmother     No Known Problems Maternal Grandfather     Cancer Paternal Grandmother     Heart attack Paternal Grandfather    Maternal grandmother diagnosed with breast cancer in her 76s    Social History:  Social History     Socioeconomic History    Marital status: Single     Spouse name: Not on file    Number of children: Not on file    Years of education: Not on file    Highest education level: Not on file   Occupational History     Employer: [employer]   Social Needs    Financial resource strain: Not on file    Food insecurity     Worry: Not on file     Inability: Not on file    Transportation needs     Medical: Not on file     Non-medical: Not on file   Tobacco Use    Smoking status: Never Smoker    Smokeless tobacco: Never Used   Substance and Sexual Activity    Alcohol use:  Yes     Alcohol/week: 3 0 standard drinks     Types: 3 Glasses of wine per week     Frequency: 2-4 times a month     Drinks per session: 1 or 2     Binge frequency: Never     Comment: Socially    Drug use: Never    Sexual activity: Yes     Partners: Male     Birth control/protection: OCP   Lifestyle    Physical activity     Days per week: Not on file     Minutes per session: Not on file    Stress: Not on file   Relationships    Social connections     Talks on phone: Not on file     Gets together: Not on file     Attends Restorationist service: Not on file     Active member of club or organization: Not on file     Attends meetings of clubs or organizations: Not on file     Relationship status: Not on file    Intimate partner violence     Fear of current or ex partner: Not on file     Emotionally abused: Not on file     Physically abused: Not on file     Forced sexual activity: Not on file   Other Topics Concern    Not on file   Social History Narrative    Brushes teeth daily; regular dental care    Lives with parents/grandparents    Sleeps 8-10 hours a day     Presently lives with her parents  Patient is single  Allergies   Allergen Reactions    Amoxicillin GI Intolerance       Current Outpatient Medications:     cyclobenzaprine (FLEXERIL) 10 mg tablet, Take 1 tablet (10 mg total) by mouth daily at bedtime, Disp: 30 tablet, Rfl: 0    Drospiren-Eth Estrad-Levomefol 3-0 02-0 451 MG TABS, Take 1 tablet by mouth daily at bedtime Skip placebo pills and start new pack immediately, Disp: 84 tablet, Rfl: 4    magnesium gluconate (MAGONATE) 500 mg tablet, Take 1 tablet (500 mg total) by mouth daily, Disp: , Rfl:     pantoprazole (PROTONIX) 40 mg tablet, Take 1 tablet (40 mg total) by mouth daily, Disp: 30 tablet, Rfl: 3    rizatriptan (MAXALT) 10 MG tablet, Take 1 tablet (10 mg total) by mouth once as needed for migraine for up to 1 dose May repeat in 2 hours if needed, Disp: 9 tablet, Rfl: 0    Review of Systems:  Denies fevers, chills, unintentional weight loss, excessive fatigue, chest pain, shortness of breath, abdominal pain, nausea, vomiting, urinary incontinence, urinary frequency, vaginal bleeding, vaginal discharge  All other systems negative unless otherwise stated       Physical Exam:  /70 (BP Location: Right arm, Patient Position: Sitting, Cuff Size: Standard)   Ht 5' 3" (1 6 m)   Wt 67 kg (147 lb 9 6 oz)   LMP 02/11/2021   BMI 26 15 kg/m²  Body mass index is 26 15 kg/m²  GEN: The patient was alert and oriented x3, pleasant well-appearing female in no acute distress  HEENT:  Unremarkable, no anterior or posterior lymphadenopathy, no thyromegaly  CV:  Regular rate and rhythm, normal S1 and S2, no murmurs  RESP:  Clear to auscultation bilaterally, no wheezes, rales or rhonchi  BREAST:  Symmetric breasts with no palpable breast masses or obvious breast lesions  She has no retractions or nipple discharge  She has no axillary abnormalities or palpable masses  GI:  Soft, nontender, non-distended  MSK: bilateral lower extremities are nontender, no edema  : Normal appearing external female genitalia, normal appearing urethral meatus  On sterile speculum exam, normal appearing vaginal epithelium, no vaginal discharge, no bleeding, grossly normal appearing cervix  On bimanual exam, no cervical motion tenderness; uterus is smooth, mobile, nontender 6 weeks size  No tenderness or fullness in the bilateral adnexa

## 2021-03-04 LAB
LAB AP GYN PRIMARY INTERPRETATION: NORMAL
Lab: NORMAL

## 2021-04-13 DIAGNOSIS — Z23 ENCOUNTER FOR IMMUNIZATION: ICD-10-CM

## 2021-04-15 ENCOUNTER — VBI (OUTPATIENT)
Dept: ADMINISTRATIVE | Facility: OTHER | Age: 26
End: 2021-04-15

## 2021-04-15 ENCOUNTER — TELEPHONE (OUTPATIENT)
Dept: FAMILY MEDICINE CLINIC | Facility: CLINIC | Age: 26
End: 2021-04-15

## 2021-04-27 ENCOUNTER — IMMUNIZATIONS (OUTPATIENT)
Dept: FAMILY MEDICINE CLINIC | Facility: HOSPITAL | Age: 26
End: 2021-04-27

## 2021-04-27 DIAGNOSIS — Z23 ENCOUNTER FOR IMMUNIZATION: Primary | ICD-10-CM

## 2021-04-27 PROCEDURE — 0001A SARS-COV-2 / COVID-19 MRNA VACCINE (PFIZER-BIONTECH) 30 MCG: CPT

## 2021-04-27 PROCEDURE — 91300 SARS-COV-2 / COVID-19 MRNA VACCINE (PFIZER-BIONTECH) 30 MCG: CPT

## 2021-05-21 ENCOUNTER — IMMUNIZATIONS (OUTPATIENT)
Dept: FAMILY MEDICINE CLINIC | Facility: HOSPITAL | Age: 26
End: 2021-05-21

## 2021-05-21 DIAGNOSIS — Z23 ENCOUNTER FOR IMMUNIZATION: Primary | ICD-10-CM

## 2021-05-21 PROCEDURE — 91300 SARS-COV-2 / COVID-19 MRNA VACCINE (PFIZER-BIONTECH) 30 MCG: CPT

## 2021-05-21 PROCEDURE — 0002A SARS-COV-2 / COVID-19 MRNA VACCINE (PFIZER-BIONTECH) 30 MCG: CPT

## 2021-10-25 ENCOUNTER — OFFICE VISIT (OUTPATIENT)
Dept: GASTROENTEROLOGY | Facility: AMBULARY SURGERY CENTER | Age: 26
End: 2021-10-25
Payer: COMMERCIAL

## 2021-10-25 VITALS
HEIGHT: 62 IN | BODY MASS INDEX: 27.42 KG/M2 | DIASTOLIC BLOOD PRESSURE: 68 MMHG | WEIGHT: 149 LBS | SYSTOLIC BLOOD PRESSURE: 114 MMHG

## 2021-10-25 DIAGNOSIS — K58.2 IRRITABLE BOWEL SYNDROME WITH BOTH CONSTIPATION AND DIARRHEA: Primary | ICD-10-CM

## 2021-10-25 PROCEDURE — 3008F BODY MASS INDEX DOCD: CPT | Performed by: PHYSICIAN ASSISTANT

## 2021-10-25 PROCEDURE — 99214 OFFICE O/P EST MOD 30 MIN: CPT | Performed by: PHYSICIAN ASSISTANT

## 2021-10-25 PROCEDURE — 1036F TOBACCO NON-USER: CPT | Performed by: PHYSICIAN ASSISTANT

## 2021-11-22 ENCOUNTER — TELEMEDICINE (OUTPATIENT)
Dept: FAMILY MEDICINE CLINIC | Facility: CLINIC | Age: 26
End: 2021-11-22
Payer: COMMERCIAL

## 2021-11-22 DIAGNOSIS — J06.9 UPPER RESPIRATORY TRACT INFECTION, UNSPECIFIED TYPE: Primary | ICD-10-CM

## 2021-11-22 DIAGNOSIS — S60.569A INSECT BITE OF HAND, UNSPECIFIED LATERALITY, INITIAL ENCOUNTER: ICD-10-CM
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
## 2021-11-22 PROCEDURE — 99213 OFFICE O/P EST LOW 20 MIN: CPT | Performed by: FAMILY MEDICINE

## 2021-11-22 RX ORDER — TRIAMCINOLONE ACETONIDE 1 MG/G
CREAM TOPICAL 2 TIMES DAILY
Qty: 30 G | Refills: 0 | Status: SHIPPED | OUTPATIENT
Start: 2021-11-22

## 2021-11-22 RX ORDER — METHYLPREDNISOLONE 4 MG/1
TABLET ORAL
Qty: 21 EACH | Refills: 0 | Status: SHIPPED | OUTPATIENT
Start: 2021-11-22

## 2021-12-17 ENCOUNTER — TELEPHONE (OUTPATIENT)
Dept: FAMILY MEDICINE CLINIC | Facility: CLINIC | Age: 26
End: 2021-12-17

## 2021-12-17 DIAGNOSIS — Z20.822 COVID-19 RULED OUT: Primary | ICD-10-CM

## 2021-12-17 PROCEDURE — U0003 INFECTIOUS AGENT DETECTION BY NUCLEIC ACID (DNA OR RNA); SEVERE ACUTE RESPIRATORY SYNDROME CORONAVIRUS 2 (SARS-COV-2) (CORONAVIRUS DISEASE [COVID-19]), AMPLIFIED PROBE TECHNIQUE, MAKING USE OF HIGH THROUGHPUT TECHNOLOGIES AS DESCRIBED BY CMS-2020-01-R: HCPCS | Performed by: FAMILY MEDICINE

## 2021-12-17 PROCEDURE — U0005 INFEC AGEN DETEC AMPLI PROBE: HCPCS | Performed by: FAMILY MEDICINE

## 2021-12-18 LAB — SARS-COV-2 RNA RESP QL NAA+PROBE: NEGATIVE

## 2022-03-21 ENCOUNTER — TELEPHONE (OUTPATIENT)
Dept: OBGYN CLINIC | Facility: CLINIC | Age: 27
End: 2022-03-21

## 2022-04-19 ENCOUNTER — ANNUAL EXAM (OUTPATIENT)
Dept: OBGYN CLINIC | Facility: CLINIC | Age: 27
End: 2022-04-19
Payer: COMMERCIAL

## 2022-04-19 VITALS
DIASTOLIC BLOOD PRESSURE: 68 MMHG | WEIGHT: 147 LBS | HEIGHT: 62 IN | SYSTOLIC BLOOD PRESSURE: 112 MMHG | BODY MASS INDEX: 27.05 KG/M2

## 2022-04-19 DIAGNOSIS — Z01.419 WOMEN'S ANNUAL ROUTINE GYNECOLOGICAL EXAMINATION: Primary | ICD-10-CM

## 2022-04-19 DIAGNOSIS — Z30.41 ENCOUNTER FOR SURVEILLANCE OF CONTRACEPTIVE PILLS: ICD-10-CM

## 2022-04-19 PROCEDURE — 1036F TOBACCO NON-USER: CPT | Performed by: PHYSICIAN ASSISTANT

## 2022-04-19 PROCEDURE — 3008F BODY MASS INDEX DOCD: CPT | Performed by: PHYSICIAN ASSISTANT

## 2022-04-19 PROCEDURE — 99395 PREV VISIT EST AGE 18-39: CPT | Performed by: PHYSICIAN ASSISTANT

## 2022-04-19 RX ORDER — DROSPIRENONE, ETHINYL ESTRADIOL AND LEVOMEFOLATE CALCIUM AND LEVOMEFOLATE CALCIUM 3-0.02(24)
KIT ORAL
Qty: 84 TABLET | Refills: 3 | Status: SHIPPED | OUTPATIENT
Start: 2022-04-19

## 2022-04-19 NOTE — PROGRESS NOTES
ASSESSMENT & PLAN:   Diagnoses and all orders for this visit:    Women's annual routine gynecological examination    Encounter for surveillance of contraceptive pills  -     Drospiren-Eth Estrad-Levomefol 3-0 02-0 451 MG TABS; Take 1 tablet orally daily          The following were reviewed in today's visit: ASCCP guidelines, Gardisil vaccination, STD testing breast self exam, STD testing, exercise and healthy diet  Patient to return to office in yearly for annual exam      All questions have been answered to her satisfaction  CC:  Annual Gynecologic Examination  Chief Complaint   Patient presents with    Gynecologic Exam     patient is here for her yearly exam, pap up to date(2/25/21-wnl), mammo/DXA not indicated, colonoscopy 9/16/19-repeat at age 49  Patient has no concerns at this time  HPI: Drew Monge is a 23 y o  Dary Alyshaclifford who presents for annual gynecologic examination  She has the following concerns:  Patient feeling well at this time  She has been on her OCP for 10 years  She has started to get some headaches during her cycle and is considered switching her birth control in the future         Health Maintenance:    Exercise: frequently  Breast exams/breast awareness: yes  Diet: well balanced diet      Past Medical History:   Diagnosis Date    Anxiety     History of transfusion     Age 4-as a child refused to eat,anemia    Irritable bowel syndrome     Migraine 01/01/2010    Have always had them but have increased in the last three mo    Ovarian cyst     LEFT     Wears glasses        Past Surgical History:   Procedure Laterality Date    COLONOSCOPY      CYSTECTOMY      removed from ovary     PELVIC LAPAROSCOPY Left 6/13/2019    Procedure: LAPAROSCOPIC OVARIAN CYSTECTOMY;  Surgeon: Seda Velasquez DO;  [facility];  Service: Gynecology    UPPER GASTROINTESTINAL ENDOSCOPY      WISDOM TOOTH EXTRACTION         Past OB/Gyn History:  Period Cycle (Days): 28  Period Duration (Days): 5  Period Pattern: Regular  Menstrual Flow: Moderate  Dysmenorrhea: (!) Mild  Dysmenorrhea Symptoms: Cramping,HeadachePatient's last menstrual period was 03/22/2022 (approximate)  Last Pap: 021 : no abnormalities  History of abnormal Pap smear: no  HPV vaccine completed: yes    Patient is currently sexually active  STD testing: no  Current contraception: OCP (estrogen/progesterone)      Family History  Family History   Problem Relation Age of Onset    No Known Problems Mother     Hypertension Father     Hyperlipidemia Father     Irritable bowel syndrome Brother     Breast cancer Maternal Grandmother     No Known Problems Maternal Grandfather     Cancer Paternal Grandmother     Heart attack Paternal Grandfather        Family history of uterine or ovarian cancer: yes - paternal GM  Family history of breast cancer: yes  Family history of colon cancer: no    Social History:  Social History     Socioeconomic History    Marital status: Single     Spouse name: Not on file    Number of children: Not on file    Years of education: Not on file    Highest education level: Not on file   Occupational History     Employer: [employer]   Tobacco Use    Smoking status: Never Smoker    Smokeless tobacco: Never Used   Vaping Use    Vaping Use: Never used   Substance and Sexual Activity    Alcohol use:  Yes     Alcohol/week: 3 0 standard drinks     Types: 3 Glasses of wine per week     Comment: Occs     Drug use: Yes     Types: Marijuana     Comment: Card     Sexual activity: Yes     Partners: Male     Birth control/protection: OCP   Other Topics Concern    Not on file   Social History Narrative    Brushes teeth daily; regular dental care    Lives with parents/grandparents    Sleeps 8-10 hours a day     Social Determinants of Health     Financial Resource Strain: Not on file   Food Insecurity: Not on file   Transportation Needs: Not on file   Physical Activity: Not on file   Stress: Not on file   Social Connections: Not on file   Intimate Partner Violence: Not on file   Housing Stability: Not on file     Domestic violence screen: negative    Allergies: Allergies   Allergen Reactions    Amoxicillin GI Intolerance       Medications:    Current Outpatient Medications:     DIGESTIVE ENZYMES PO, Take by mouth, Disp: , Rfl:     Drospiren-Eth Estrad-Levomefol 3-0 02-0 451 MG TABS, Take 1 tablet orally daily, Disp: 84 tablet, Rfl: 3    methylPREDNISolone 4 MG tablet therapy pack, Use as directed on package, Disp: 21 each, Rfl: 0    NON FORMULARY, Fiber cookies, Disp: , Rfl:     Probiotic Product (PROBIOTIC-10 PO), Take by mouth, Disp: , Rfl:     rizatriptan (MAXALT) 10 MG tablet, Take 1 tablet (10 mg total) by mouth once as needed for migraine for up to 1 dose May repeat in 2 hours if needed, Disp: 9 tablet, Rfl: 0    triamcinolone (KENALOG) 0 1 % cream, Apply topically 2 (two) times a day, Disp: 30 g, Rfl: 0    Review of Systems:  Review of Systems   Constitutional: Negative for chills, fever and unexpected weight change  Respiratory: Negative for shortness of breath  Cardiovascular: Negative for chest pain  Gastrointestinal: Negative for abdominal pain, diarrhea, nausea and vomiting  Skin: Negative for rash  Psychiatric/Behavioral: Negative for dysphoric mood  The patient is not nervous/anxious  Physical Exam:  /68 (BP Location: Right arm, Patient Position: Sitting, Cuff Size: Standard)   Ht 5' 2" (1 575 m)   Wt 66 7 kg (147 lb)   LMP 03/22/2022 (Approximate) Comment: about 1 month ago  BMI 26 89 kg/m²    Physical Exam  Constitutional:       Appearance: Normal appearance  Genitourinary:      Vulva and urethral meatus normal       No lesions in the vagina  No vaginal discharge, erythema or bleeding  Right Adnexa: not tender and no mass present  Left Adnexa: not tender and no mass present  No cervical discharge or lesion        Uterus is not tender  Breasts: Breasts are symmetrical       Right: No mass or skin change  Left: No mass or skin change  HENT:      Head: Normocephalic and atraumatic  Cardiovascular:      Rate and Rhythm: Normal rate and regular rhythm  Heart sounds: Normal heart sounds  No murmur heard  No friction rub  No gallop  Pulmonary:      Effort: Pulmonary effort is normal       Breath sounds: Normal breath sounds  No wheezing, rhonchi or rales  Abdominal:      General: Abdomen is flat  There is no distension  Palpations: Abdomen is soft  Tenderness: There is no abdominal tenderness  Musculoskeletal:      Cervical back: Neck supple  Neurological:      General: No focal deficit present  Mental Status: She is alert  Skin:     General: Skin is warm and dry  Psychiatric:         Mood and Affect: Mood normal          Behavior: Behavior normal    Vitals reviewed

## 2022-06-28 ENCOUNTER — HOSPITAL ENCOUNTER (EMERGENCY)
Facility: HOSPITAL | Age: 27
Discharge: HOME | End: 2022-06-28
Attending: EMERGENCY MEDICINE
Payer: COMMERCIAL

## 2022-06-28 ENCOUNTER — APPOINTMENT (EMERGENCY)
Dept: RADIOLOGY | Facility: HOSPITAL | Age: 27
End: 2022-06-28
Payer: COMMERCIAL

## 2022-06-28 VITALS
OXYGEN SATURATION: 98 % | SYSTOLIC BLOOD PRESSURE: 110 MMHG | HEART RATE: 85 BPM | DIASTOLIC BLOOD PRESSURE: 68 MMHG | RESPIRATION RATE: 16 BRPM | BODY MASS INDEX: 26.68 KG/M2 | WEIGHT: 145 LBS | TEMPERATURE: 98.8 F | HEIGHT: 62 IN

## 2022-06-28 DIAGNOSIS — S29.011A STRAIN OF RIGHT PECTORALIS MUSCLE, INITIAL ENCOUNTER: Primary | ICD-10-CM

## 2022-06-28 LAB
ATRIAL RATE: 89 BPM
P AXIS: 56 DEGREES
PR INTERVAL: 166 MS
QRS AXIS: 66 DEGREES
QRSD INTERVAL: 82 MS
QT INTERVAL: 358 MS
QTC INTERVAL: 426 MS
T WAVE AXIS: 50 DEGREES
VENTRICULAR RATE: 85 BPM

## 2022-06-28 PROCEDURE — 93010 ELECTROCARDIOGRAM REPORT: CPT | Performed by: INTERNAL MEDICINE

## 2022-06-28 PROCEDURE — 71045 X-RAY EXAM CHEST 1 VIEW: CPT

## 2022-06-28 PROCEDURE — 99284 EMERGENCY DEPT VISIT MOD MDM: CPT | Performed by: EMERGENCY MEDICINE

## 2022-06-28 PROCEDURE — 99285 EMERGENCY DEPT VISIT HI MDM: CPT

## 2022-06-28 PROCEDURE — 93005 ELECTROCARDIOGRAM TRACING: CPT

## 2022-06-28 RX ORDER — CYCLOBENZAPRINE HCL 10 MG
10 TABLET ORAL 2 TIMES DAILY PRN
Qty: 20 TABLET | Refills: 0 | Status: SHIPPED | OUTPATIENT
Start: 2022-06-28

## 2022-06-28 RX ORDER — NAPROXEN 500 MG/1
500 TABLET ORAL 2 TIMES DAILY WITH MEALS
Qty: 30 TABLET | Refills: 0 | Status: SHIPPED | OUTPATIENT
Start: 2022-06-28

## 2022-06-28 NOTE — ED PROVIDER NOTES
History  Chief Complaint   Patient presents with    Chest Pain     Pt reports R CP x1 month, spoke with PCP this am and was told to come to ER, thinks she pulled a muscle, denies nausea/dizziness/SOB      The patient is a 52-year-old female past medical history of anxiety who presents to the emergency department with complaint of right-sided chest pain  Patient reports that her chest pain has been intermittently ongoing for the past month  She describes her pain as sharp and stabbing at times and rates her pain as 7/10 while at other times it is a very dull ache and rates it a 1 or 2/10  She also reports that her chest pain is reproducible with palpation and with arm movement  Patient denies any sudden onset of her symptoms and reports that she has been lifting weights at the gym more often over the past few weeks  She denies any shortness of breath, lightheadedness, numbness, tingling, abdominal pain, nausea, vomiting, rash or fever  Patient also denies any history of blood clot and denies any recent pain in her calves, discoloration of her lower extremities or edema  The patient does report taking Beyaz for her oral contraception  Prior to Admission Medications   Prescriptions Last Dose Informant Patient Reported? Taking?    DIGESTIVE ENZYMES PO  Self Yes No   Sig: Take by mouth   Drospiren-Eth Estrad-Levomefol 3-0 02-0 451 MG TABS   No No   Sig: Take 1 tablet orally daily   NON FORMULARY  Self Yes No   Sig: Fiber cookies   Probiotic Product (PROBIOTIC-10 PO)  Self Yes No   Sig: Take by mouth   methylPREDNISolone 4 MG tablet therapy pack   No No   Sig: Use as directed on package   rizatriptan (MAXALT) 10 MG tablet  Self No No   Sig: Take 1 tablet (10 mg total) by mouth once as needed for migraine for up to 1 dose May repeat in 2 hours if needed   triamcinolone (KENALOG) 0 1 % cream   No No   Sig: Apply topically 2 (two) times a day      Facility-Administered Medications: None       Past Medical History:   Diagnosis Date    Anxiety     History of transfusion     Age 4-as a child refused to eat,anemia    Irritable bowel syndrome     Migraine 01/01/2010    Have always had them but have increased in the last three mo    Ovarian cyst     LEFT     Wears glasses        Past Surgical History:   Procedure Laterality Date    COLONOSCOPY      CYSTECTOMY      removed from ovary     PELVIC LAPAROSCOPY Left 6/13/2019    Procedure: LAPAROSCOPIC OVARIAN CYSTECTOMY;  Surgeon: Bailey Ac DO;  [facility];  Service: Gynecology    UPPER GASTROINTESTINAL ENDOSCOPY      WISDOM TOOTH EXTRACTION         Family History   Problem Relation Age of Onset    No Known Problems Mother     Hypertension Father     Hyperlipidemia Father     Irritable bowel syndrome Brother     Breast cancer Maternal Grandmother     No Known Problems Maternal Grandfather     Cancer Paternal Grandmother     Heart attack Paternal Grandfather      I have reviewed and agree with the history as documented  E-Cigarette/Vaping    E-Cigarette Use Never User      E-Cigarette/Vaping Substances    Nicotine No     THC No     CBD No     Flavoring No     Other No      Social History     Tobacco Use    Smoking status: Never Smoker    Smokeless tobacco: Never Used   Vaping Use    Vaping Use: Never used   Substance Use Topics    Alcohol use: Yes     Alcohol/week: 3 0 standard drinks     Types: 3 Glasses of wine per week     Comment: Occs     Drug use: Yes     Types: Marijuana     Comment: Card         Review of Systems   Constitutional: Negative for chills and fever  HENT: Negative for ear pain and sore throat  Eyes: Negative for pain and visual disturbance  Respiratory: Negative for cough and shortness of breath  Cardiovascular: Positive for chest pain  Negative for palpitations  Reproducible with palpation     Gastrointestinal: Negative for abdominal distention, abdominal pain, constipation, diarrhea, nausea and vomiting  Endocrine: Negative  Genitourinary: Negative for dysuria and hematuria  Musculoskeletal: Negative for arthralgias, back pain, neck pain and neck stiffness  Skin: Negative for color change and rash  Allergic/Immunologic: Negative  Neurological: Negative for dizziness, seizures, syncope, weakness, light-headedness and headaches  Hematological: Negative  Psychiatric/Behavioral: Negative  All other systems reviewed and are negative  Physical Exam  ED Triage Vitals [06/28/22 1258]   Temperature Pulse Respirations Blood Pressure SpO2   98 8 °F (37 1 °C) 85 16 110/68 98 %      Temp Source Heart Rate Source Patient Position - Orthostatic VS BP Location FiO2 (%)   Oral Monitor Sitting Left arm --      Pain Score       5             Orthostatic Vital Signs  Vitals:    06/28/22 1258   BP: 110/68   Pulse: 85   Patient Position - Orthostatic VS: Sitting       Physical Exam  Vitals and nursing note reviewed  Constitutional:       General: She is not in acute distress  Appearance: She is well-developed  HENT:      Head: Normocephalic and atraumatic  Eyes:      Extraocular Movements: Extraocular movements intact  Conjunctiva/sclera: Conjunctivae normal       Pupils: Pupils are equal, round, and reactive to light  Neck:      Thyroid: No thyromegaly  Vascular: No JVD  Cardiovascular:      Rate and Rhythm: Normal rate and regular rhythm  Pulses:           Radial pulses are 2+ on the right side and 2+ on the left side  Dorsalis pedis pulses are 2+ on the right side and 2+ on the left side  Heart sounds: Normal heart sounds  No murmur heard  Pulmonary:      Effort: Pulmonary effort is normal  No respiratory distress  Breath sounds: Normal breath sounds  No decreased breath sounds, wheezing, rhonchi or rales  Chest:      Chest wall: Tenderness present  No mass, deformity, crepitus or edema  There is no dullness to percussion        Comments: Tenderness to palpation of the right pectoral muscle  Abdominal:      General: Bowel sounds are normal       Palpations: Abdomen is soft  There is no mass  Tenderness: There is no abdominal tenderness  There is no guarding or rebound  Musculoskeletal:         General: Normal range of motion  Cervical back: Normal range of motion and neck supple  Right lower leg: No tenderness  No edema  Left lower leg: No tenderness  No edema  Lymphadenopathy:      Cervical: No cervical adenopathy  Skin:     General: Skin is warm and dry  Capillary Refill: Capillary refill takes less than 2 seconds  Coloration: Skin is not cyanotic  Findings: No erythema or rash  Neurological:      General: No focal deficit present  Mental Status: She is alert and oriented to person, place, and time  Cranial Nerves: No cranial nerve deficit  Motor: No weakness  ED Medications  Medications - No data to display    Diagnostic Studies  Results Reviewed     Procedure Component Value Units Date/Time    POCT pregnancy, urine [[id number]]     Lab Status: No result                  XR chest 1 view portable   Final Result by Monique Jamison MD (06/28 1440)      No acute cardiopulmonary disease                    Workstation performed: [id number]               Procedures  ECG 12 Lead Documentation Only    Date/Time: 6/28/2022 1:23 PM  Performed by: Hollie Petty DO  Authorized by: Hollie Petty DO     Indications / Diagnosis:  Chest pain  ECG reviewed by me, the ED Provider: yes    Patient location:  ED  Previous ECG:     Previous ECG:  Unavailable    Comparison to cardiac monitor: No    Interpretation:     Interpretation: normal    Rate:     ECG rate:  85    ECG rate assessment: normal    Rhythm:     Rhythm: sinus rhythm    Ectopy:     Ectopy: none    QRS:     QRS axis:  Normal    QRS intervals:  Normal  Conduction:     Conduction: normal    ST segments:     ST segments: Normal  T waves:     T waves: normal    Comments:      No acute ischemia, no S1Q3T3  ED Course  ED Course as of 06/28/22 1514   Tue Jun 28, 2022   1502 There was no indication of S1 Q 3 T3 on her ECG  The chest x-ray was unremarkable for pneumothorax  The patient remains normotensive with a heart rate in the 80s  She currently rates her pain 0/10 at rest   The patient will be diagnosed with a pectoral muscle strength and she will be prescribed Flexeril and naproxen from the emergency department with strict return precautions  Further instructions per discharge orders  MDM  Number of Diagnoses or Management Options  Strain of right pectoralis muscle, initial encounter  Diagnosis management comments: The patient is a 50-year-old female past medical history of anxiety who presents to the emergency department with complaint of right-sided chest pain  Patient reports that her chest pain has been intermittently ongoing for the past month  Upon initial presentation the patient was alert oriented x4 in no acute distress  Her chest pain was reproducible to palpation patient has no history of blood clot previously  I cannot PERC her out due to her taking an estrogen only contraceptive pill, however my clinical suspicion for PE is extremely low  The patient is not exhibiting shortness of breath at this time  I have ordered a chest x-ray and ECG  Results pending        Disposition  Final diagnoses:   Strain of right pectoralis muscle, initial encounter     Time reflects when diagnosis was documented in both MDM as applicable and the Disposition within this note     Time User Action Codes Description Comment    6/28/2022  2:51 PM Amando Morales Add [[id number]] Strain of right pectoralis muscle, initial encounter       ED Disposition     ED Disposition   Discharge    Condition   Stable    Date/Time   Tue Jun 28, 2022  2:55 PM    Comment   David Posada 42 discharge to home/self care  Follow-up Information     Follow up With Specialties Details Why Contact Info Additional Information    Zayda Mccann MD Family Medicine Schedule an appointment as soon as possible for a visit  Are right-sided chest and shoulder pain  [address]  [address] [facility] Emergency Department Emergency Medicine  As needed [address] [facility] [address] [facility] Emergency Department, [address], [place], [place], [address]          Patient's Medications   Discharge Prescriptions    CYCLOBENZAPRINE (FLEXERIL) 10 MG TABLET    Take 1 tablet (10 mg total) by mouth 2 (two) times a day as needed for muscle spasms       Start Date: 6/28/2022 End Date: --       Order Dose: 10 mg       Quantity: 20 tablet    Refills: 0    NAPROXEN (NAPROSYN) 500 MG TABLET    Take 1 tablet (500 mg total) by mouth 2 (two) times a day with meals       Start Date: 6/28/2022 End Date: --       Order Dose: 500 mg       Quantity: 30 tablet    Refills: 0     No discharge procedures on file  PDMP Review     None           ED Provider  Attending physically available and evaluated [facility]  I managed the patient along with the ED Attending      Electronically Signed by         Hannah Powell DO  06/28/22 8663

## 2022-06-28 NOTE — DISCHARGE INSTRUCTIONS
Your ECG and chest x-ray were unremarkable in the emergency department  You have been diagnosed with a muscle strain of the right anterior chest   You have been given a prescription for Flexeril instructed take 1 tablet by mouth twice daily  Please avoid operating heavy equipment or driving vehicles while on the influence of this medication  You have also been given a prescription for naproxen instructed to take 1 tablet by mouth twice daily with small meals to avoid upset stomach  Please follow-up with your PCP should her symptoms not begin to improve over the next 48 hours  Please return to the emergency department should you develop severe chest pain, shortness of breath, lightheadedness, syncope, swelling of the lower extremities, discoloration of the lower extremities or any other symptoms that you find concerning

## 2022-06-28 NOTE — ED ATTENDING ATTESTATION
6/28/2022  IJennifer MD, saw and evaluated the patient  I have discussed the patient with the resident/non-physician practitioner and agree with the resident's/non-physician practitioner's findings, Plan of Care, and MDM as documented in the resident's/non-physician practitioner's note, except where noted  All available labs and Radiology studies were reviewed  I was present for key portions of any procedure(s) performed by the resident/non-physician practitioner and I was immediately available to provide assistance  At this point I agree with the current assessment done in the Emergency Department  I have conducted an independent evaluation of this patient a history and physical is as follows: right sided chest pain  Worse with deep breath at apex of right chest  Reproducible with palpation  Worse with movement of right arm  Has been present for past month  Not exertional  No rash  No sob  Takes birth control  No hx of DVT, no leg swelling  Uses medical marijuana for anxiety  Inhaled  Heart RRR, lungs CTA, abdomen soft ,nontender  Right upper chest wall tender with palpation and with right shoulder movements  No calf tenderness  No swelling  Not tachycardic, normal sats  No SOB with exertion  This has been present for one month  Recently started working out and seems to make this worse as does specific arm and chest movements  I do not suspect PE at this time  EKG NSR  No S1Q3T3  CXR negative      ED Course         Critical Care Time  Procedures

## 2022-09-14 DIAGNOSIS — Z30.41 ENCOUNTER FOR SURVEILLANCE OF CONTRACEPTIVE PILLS: ICD-10-CM

## 2022-09-14 RX ORDER — DROSPIRENONE, ETHINYL ESTRADIOL AND LEVOMEFOLATE CALCIUM AND LEVOMEFOLATE CALCIUM 3-0.02(24)
KIT ORAL
Qty: 84 TABLET | Refills: 3 | Status: SHIPPED | OUTPATIENT
Start: 2022-09-14

## 2022-09-14 NOTE — TELEPHONE ENCOUNTER
Patient left a message on the med refill line requesting a RX for her [facility] be sent over to a new pharmacy  She stated that [facility] DR LIAM HERNANDEZ does not participate with her new insurance  She is requesting we [facility] the [facility] [place] [address] [facility] [address]  Thank you

## 2022-12-09 ENCOUNTER — OFFICE VISIT (OUTPATIENT)
Dept: FAMILY MEDICINE CLINIC | Facility: CLINIC | Age: 27
End: 2022-12-09

## 2022-12-09 VITALS
SYSTOLIC BLOOD PRESSURE: 110 MMHG | WEIGHT: 149.2 LBS | BODY MASS INDEX: 27.46 KG/M2 | HEART RATE: 72 BPM | TEMPERATURE: 97.1 F | RESPIRATION RATE: 16 BRPM | HEIGHT: 62 IN | DIASTOLIC BLOOD PRESSURE: 70 MMHG | OXYGEN SATURATION: 100 %

## 2022-12-09 DIAGNOSIS — Z11.4 SCREENING FOR HIV (HUMAN IMMUNODEFICIENCY VIRUS): ICD-10-CM

## 2022-12-09 DIAGNOSIS — G43.709 CHRONIC MIGRAINE WITHOUT AURA WITHOUT STATUS MIGRAINOSUS, NOT INTRACTABLE: ICD-10-CM

## 2022-12-09 DIAGNOSIS — G43.709 CHRONIC MIGRAINE WITHOUT AURA WITHOUT STATUS MIGRAINOSUS, NOT INTRACTABLE: Primary | ICD-10-CM

## 2022-12-09 DIAGNOSIS — K58.2 IRRITABLE BOWEL SYNDROME WITH BOTH CONSTIPATION AND DIARRHEA: ICD-10-CM

## 2022-12-09 DIAGNOSIS — Z23 FLU VACCINE NEED: ICD-10-CM
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
## 2022-12-09 PROBLEM — J06.9 UPPER RESPIRATORY TRACT INFECTION: Status: RESOLVED | Noted: 2021-11-22 | Resolved: 2022-12-09

## 2022-12-09 PROBLEM — G43.909 MIGRAINE: Status: ACTIVE | Noted: 2020-10-01

## 2022-12-09 PROBLEM — S60.569A INSECT BITE HAND: Status: RESOLVED | Noted: 2021-11-22 | Resolved: 2022-12-09

## 2022-12-09 RX ORDER — RIZATRIPTAN BENZOATE 10 MG/1
10 TABLET ORAL ONCE AS NEEDED
Qty: 9 TABLET | Refills: 3 | Status: SHIPPED | OUTPATIENT
Start: 2022-12-09

## 2022-12-09 NOTE — PROGRESS NOTES
Name: Lissette Mckenzie      : 1995      MRN: [id number]  Encounter Provider: Rutherford Goodpasture, MD  Encounter Date: 2022   Encounter department: [facility]     1  Chronic migraine without aura without status migrainosus, not intractable  Assessment & Plan: To start magnesium 400 mg daily and B2 400 mg daily   To avoid triggers  Rizatriptan is helping     Orders:  -     rizatriptan (MAXALT) 10 mg tablet; Take 1 tablet (10 mg total) by mouth once as needed for migraine for up to 1 dose May repeat in 2 hours if needed    2  Screening for HIV (human immunodeficiency virus)  -     HIV 1/2 Antigen/Antibody (4th Generation) w Reflex [facility]; Future    3  Chronic migraine without aura without status migrainosus, not intractable  Comments:  she will look into FMLA paper work   Assessment & Plan: To start magnesium 400 mg daily and B2 400 mg daily   To avoid triggers  Rizatriptan is helping     Orders:  -     rizatriptan (MAXALT) 10 mg tablet; Take 1 tablet (10 mg total) by mouth once as needed for migraine for up to 1 dose May repeat in 2 hours if needed    4  Irritable bowel syndrome with both constipation and diarrhea  Assessment & Plan:  Avoiding bread and pasta which is helping         5  Flu vaccine need  -     influenza vaccine, quadrivalent, 0 5 mL, preservative-free, for adult and pediatric patients 6 mos+ (AFLURIA, FLUARIX, FLULAVAL, FLUZONE)      BMI Counseling: Body mass index is 27 29 kg/m²  The BMI is above normal  Nutrition recommendations include decreasing portion sizes and encouraging healthy choices of fruits and vegetables  Exercise recommendations include moderate physical activity 150 minutes/week  No pharmacotherapy was ordered  Rationale for BMI follow-up plan is due to patient being overweight or obese  Depression Screening and Follow-up Plan: Patient was screened for depression during today's encounter   They screened negative with a PHQ-2 score of 0  Subjective    here for follow up  Feeling well overall  Only gets migraines around her periods  Manageable     Migraine  This is a chronic problem  The current episode started more than 1 year ago  The problem has been waxing and waning since onset  The pain is present in the right unilateral  The pain does not radiate  The pain quality is similar to prior headaches  She is experiencing no pain  Pertinent negatives include no abdominal pain, abnormal behavior, anorexia, back pain, blurred vision, coughing, diarrhea, dizziness, drainage, eye watering, facial sweating, fever, hearing loss, insomnia, loss of balance, muscle aches, nausea, neck pain, rhinorrhea, seizures, sinus pressure, sore throat, swollen glands, tingling, tinnitus, visual change or vomiting  Review of Systems   Constitutional: Negative for fever  HENT: Negative for hearing loss, rhinorrhea, sinus pressure, sore throat and tinnitus  Eyes: Negative for blurred vision  Respiratory: Negative for cough  Gastrointestinal: Negative for abdominal pain, anorexia, diarrhea, nausea and vomiting  Musculoskeletal: Negative for back pain and neck pain  Neurological: Negative for dizziness, tingling, seizures and loss of balance  Psychiatric/Behavioral: The patient does not have insomnia          Current Outpatient Medications on File Prior to Visit   Medication Sig   • albuterol (ProAir HFA) 90 mcg/act inhaler Inhale 2 puffs every 4 (four) hours as needed for wheezing   • Drospiren-Eth Estrad-Levomefol 3-0 02-0 451 MG TABS Take 1 tablet orally daily   • Probiotic Product (PROBIOTIC-10 PO) Take by mouth   • Spacer/Aero-Holding Chambers (Vortex Valved Holding Chamber) TONI Use 2 (two) times a day   • [DISCONTINUED] rizatriptan (MAXALT) 10 MG tablet Take 1 tablet (10 mg total) by mouth once as needed for migraine for up to 1 dose May repeat in 2 hours if needed       Objective     /70 (BP Location: Left arm, Patient Position: Sitting, Cuff Size: Adult)   Pulse 72   Temp (!) 97 1 °F (36 2 °C) (Tympanic)   Resp 16   Ht 5' 2" (1 575 m)   Wt 67 7 kg (149 lb 3 2 oz)   SpO2 100%   BMI 27 29 kg/m²     Physical Exam  Constitutional:       Appearance: Normal appearance  Cardiovascular:      Rate and Rhythm: Normal rate and regular rhythm  Pulses: Normal pulses  Heart sounds: Normal heart sounds  Pulmonary:      Effort: Pulmonary effort is normal       Breath sounds: Normal breath sounds  Skin:     Capillary Refill: Capillary refill takes less than 2 seconds  Neurological:      General: No focal deficit present  Mental Status: She is alert and oriented to person, place, and time     Psychiatric:         Mood and Affect: Mood normal          Behavior: Behavior normal        Veronica Haskins MD

## 2023-03-20 NOTE — PROGRESS NOTES
Assessment/Plan:  - Discussed possible endometriosis given clinical picture  - Order placed for pelvic ultrasound  - Will switch OCP to Junel 24  - Discussed usage, SE, backup protection and 3 month adjustment window  - Patient to call for concerns  - RTO PRN       Diagnoses and all orders for this visit:    Pelvic pain  -     US pelvis complete w transvaginal; Future    Menorrhagia with regular cycle  -     US pelvis complete w transvaginal; Future  -     norethindrone-ethinyl estradiol-ferrous fumarate (Junel Fe 24) 1-20 MG-MCG(24) per tablet; Take 1 tablet by mouth daily          Subjective:      Patient ID: Carmenza Springer is a 33 y o  female  Damaris Boo is a 34YO G0 Wf presenting to the office with complaints of pelvic pain which has been worsening over 6 months  Patient states it is a diffuse lower abdominal/palevic discomfort  She states it is worse with exercise and better with heat  It happens before and during and after her period, more days out of the month than not  She has IBS which vacillates between diarrhea and constipation  She reports episodes of rectal pain  She has a history of a dermoid cyst which was removed about 4-5 years ago  She has not had an US since  Patient has been on her current OCP x 12 years  She gets a monthly period and states it lasts about 5 days  She is having heavier bleeding with clots more recently and more intense cramps  The following portions of the patient's history were reviewed and updated as appropriate: allergies, current medications, past family history, past medical history, past social history, past surgical history and problem list     Review of Systems   Constitutional: Negative for chills, fever and unexpected weight change  Respiratory: Negative for shortness of breath  Cardiovascular: Negative for chest pain  Gastrointestinal: Positive for abdominal pain, constipation and diarrhea  Negative for nausea and vomiting     Genitourinary: Positive for pelvic pain  Skin: Negative for rash  Objective:      /82 (BP Location: Right arm, Patient Position: Sitting, Cuff Size: Standard)   Wt 67 6 kg (149 lb)   LMP 03/04/2023 (Exact Date)   BMI 27 25 kg/m²          Physical Exam  Vitals reviewed  Constitutional:       General: She is not in acute distress  Appearance: Normal appearance  She is not ill-appearing, toxic-appearing or diaphoretic  HENT:      Head: Normocephalic and atraumatic  Pulmonary:      Effort: Pulmonary effort is normal    Abdominal:      General: Abdomen is flat  There is no distension  Palpations: Abdomen is soft  Tenderness: There is abdominal tenderness (minimally TTP with depp palpation)  Skin:     General: Skin is warm and dry  Neurological:      General: No focal deficit present  Mental Status: She is alert     Psychiatric:         Mood and Affect: Mood normal          Behavior: Behavior normal

## 2023-03-21 ENCOUNTER — OFFICE VISIT (OUTPATIENT)
Dept: OBGYN CLINIC | Facility: CLINIC | Age: 28
End: 2023-03-21

## 2023-03-21 ENCOUNTER — HOSPITAL ENCOUNTER (OUTPATIENT)
Dept: RADIOLOGY | Age: 28
Discharge: HOME | End: 2023-03-21

## 2023-03-21 VITALS — BODY MASS INDEX: 27.25 KG/M2 | DIASTOLIC BLOOD PRESSURE: 82 MMHG | WEIGHT: 149 LBS | SYSTOLIC BLOOD PRESSURE: 110 MMHG

## 2023-03-21 DIAGNOSIS — R10.2 PELVIC PAIN: Primary | ICD-10-CM

## 2023-03-21 DIAGNOSIS — R10.2 PELVIC PAIN: ICD-10-CM

## 2023-03-21 DIAGNOSIS — N92.0 MENORRHAGIA WITH REGULAR CYCLE: ICD-10-CM

## 2023-03-21 RX ORDER — NORETHINDRONE ACETATE AND ETHINYL ESTRADIOL AND FERROUS FUMARATE 1MG-20(24)
1 KIT ORAL DAILY
Qty: 84 TABLET | Refills: 0 | Status: SHIPPED | OUTPATIENT
Start: 2023-03-21 | End: 2023-06-13

## 2023-06-10 DIAGNOSIS — N92.0 MENORRHAGIA WITH REGULAR CYCLE: ICD-10-CM

## 2023-06-12 RX ORDER — NORETHINDRONE ACETATE AND ETHINYL ESTRADIOL 1MG-20(24)
KIT ORAL
Qty: 84 TABLET | Refills: 0 | Status: SHIPPED | OUTPATIENT
Start: 2023-06-12

## 2023-07-11 ENCOUNTER — OFFICE VISIT (OUTPATIENT)
Dept: OBGYN CLINIC | Facility: CLINIC | Age: 28
End: 2023-07-11
Payer: COMMERCIAL

## 2023-07-11 VITALS
WEIGHT: 146 LBS | SYSTOLIC BLOOD PRESSURE: 124 MMHG | BODY MASS INDEX: 26.87 KG/M2 | DIASTOLIC BLOOD PRESSURE: 70 MMHG | HEIGHT: 62 IN

## 2023-07-11 DIAGNOSIS — R10.2 PELVIC PAIN: Primary | ICD-10-CM

## 2023-07-11 DIAGNOSIS — N92.0 MENORRHAGIA WITH REGULAR CYCLE: ICD-10-CM

## 2023-07-11 DIAGNOSIS — D27.0 DERMOID CYST OF RIGHT OVARY: ICD-10-CM

## 2023-07-11 PROCEDURE — 99214 OFFICE O/P EST MOD 30 MIN: CPT | Performed by: OBSTETRICS & GYNECOLOGY

## 2023-07-11 RX ORDER — NORETHINDRONE ACETATE AND ETHINYL ESTRADIOL 1MG-20(24)
1 KIT ORAL DAILY
Qty: 84 TABLET | Refills: 4 | Status: SHIPPED | OUTPATIENT
Start: 2023-07-11

## 2023-07-11 NOTE — PROGRESS NOTES
Assessment Diagnoses and all orders for this visit:    Pelvic pain    Dermoid cyst of right ovary    Menorrhagia with regular cycle  -     norethindrone-ethinyl estradiol-ferrous fumarate (Blisovi 24 Fe) 1-20 MG-MCG(24) per tablet; Take 1 tablet by mouth daily        Plan  33 y.o. female continuing OCP (estrogen/progesterone), no contraindications. Return to office in 1 year for annual exam.    Recommend annual pelvic US to monitor size of R suspected dermoid. Rosario Corbin is a 33 y.o. female who presents for contraception follow up. Her current contraception is oral contraceptives (estrogen/progesterone), Blisovi 24 Fe. The patient is happy with this method. She denies headache, nausea, abnormal bleeding and abnormal discharge. Pain initially was still present the first 2 months since switching types of pills but has now improved greatly. Clotting has decreased significantly. Wants to continue use.      Patient Active Problem List   Diagnosis   • Irritable bowel syndrome with both constipation and diarrhea   • Migraine       Past Medical History:   Diagnosis Date   • Anxiety    • History of transfusion     Age 4-as a child refused to eat,anemia   • Irritable bowel syndrome    • Migraine 01/01/2010    Have always had them but have increased in the last three mo   • Migraines    • Ovarian cyst     LEFT    • Rash    • Wears glasses        Past Surgical History:   Procedure Laterality Date   • COLONOSCOPY     • CYSTECTOMY      removed from ovary    • PELVIC LAPAROSCOPY Left 6/13/2019    Procedure: LAPAROSCOPIC OVARIAN CYSTECTOMY;  Surgeon: Olya Henry DO;  Location: [facility];  Service: Gynecology   • UPPER GASTROINTESTINAL ENDOSCOPY     • WISDOM TOOTH EXTRACTION         Family History   Problem Relation Age of Onset   • Asthma Mother    • Hypertension Father    • Hyperlipidemia Father    • Irritable bowel syndrome Brother    • Asthma Brother    • Breast cancer Maternal Grandmother    • No Known Problems Maternal Grandfather    • Cancer Paternal Grandmother    • Heart attack Paternal Grandfather        Social History     Socioeconomic History   • Marital status: Single     Spouse name: Not on file   • Number of children: Not on file   • Years of education: Not on file   • Highest education level: Not on file   Occupational History     Employer: [employer]   Tobacco Use   • Smoking status: Never   • Smokeless tobacco: Never   Vaping Use   • Vaping Use: Never used   Substance and Sexual Activity   • Alcohol use: Yes     Alcohol/week: 3.0 standard drinks of alcohol     Types: 3 Glasses of wine per week     Comment: Occs    • Drug use: Yes     Types: Marijuana     Comment: Card    • Sexual activity: Yes     Partners: Male     Birth control/protection: OCP   Other Topics Concern   • Not on file   Social History Narrative    Brushes teeth daily; regular dental care    Lives with parents/grandparents    Sleeps 8-10 hours a day        Do you have pets? none Is pet allowed in bedroom? N/A    Are you a smoker? Never    Does anyone smoke in your home? No       Do you live with smokers? No    Travel [place] frequently? No   How many times a year?       Social Determinants of Health     Financial Resource Strain: Not on file   Food Insecurity: Not on file   Transportation Needs: Not on file   Physical Activity: Not on file   Stress: Not on file   Social Connections: Not on file   Intimate Partner Violence: Not on file   Housing Stability: Not on file          Current Outpatient Medications:   •  albuterol (ProAir HFA) 90 mcg/act inhaler, Inhale 2 puffs every 4 (four) hours as needed for wheezing, Disp: 18 g, Rfl: 3  •  Blisovi 24 Fe 1-20 MG-MCG(24) per tablet, TAKE 1 TABLET BY MOUTH EVERY DAY, Disp: 84 tablet, Rfl: 0  •  Probiotic Product (PROBIOTIC-10 PO), Take by mouth, Disp: , Rfl:   •  rizatriptan (MAXALT) 10 mg tablet, Take 1 tablet (10 mg total) by mouth once as needed for migraine for up to 1 dose May repeat in 2 hours if needed, Disp: 9 tablet, Rfl: 3  •  Spacer/Aero-Holding Chambers ([employer]) TONI, Use 2 (two) times a day (Patient not taking: Reported on 7/11/2023), Disp: 1 each, Rfl: 0    Allergies   Allergen Reactions   • Amoxicillin GI Intolerance       Review of Systems  Constitutional: no fever, feels well, no tiredness, no recent weight gain or loss  Breasts: no complaints of breast pain, breast lump, or nipple discharge  Gastrointestinal: no complaints nausea, vomiting  Genitourinary : as noted in HPI. Neurological: no complaints of headache    Objective     /70 (BP Location: Right arm, Patient Position: Sitting, Cuff Size: Standard)   Ht 5' 2" (1.575 m)   Wt 66.2 kg (146 lb)   LMP 06/19/2023 (Approximate)   BMI 26.70 kg/m²     GEN: The patient was alert and oriented x3, pleasant well-appearing female in no acute distress. CV: Regular rate  PULM: nonlabored respirations  MSK: Normal gait  Skin: warm, dry  Neuro: no focal deficits  Psych: normal affect and judgement, cooperative    Imaging  US pelvis complete w transvaginal  Narrative: PELVIC ULTRASOUND, COMPLETE    INDICATION:  The patient is 33years old. R10.2: Pelvic and perineal pain  N92.0: Excessive and frequent menstruation with regular cycle. COMPARISON: 3/25/2019    TECHNIQUE:   Transabdominal pelvic ultrasound was performed in sagittal and transverse planes with a curvilinear transducer. Additional transvaginal imaging was performed to better evaluate the endometrium and ovaries. Imaging included volumetric   sweeps as well as traditional still imaging technique. FINDINGS:    UTERUS:  The uterus is anteverted in position, measuring 7.9 x 3.5 x 4.7 cm. The uterus has a normal contour and echotexture. The cervix appears within normal limits. ENDOMETRIUM:    The endometrial echo complex has an AP caliber of 3.0 mm.   Its appearance is within normal limits for age and cycle and shows no filling defects. .    OVARIES/ADNEXA:  Right ovary:  2.9 x 1.8 x 1.6 cm. 4.2 mL. Left ovary:  3.1 x 1.4 x 1.3 cm. 3.0 mL. Ovarian Doppler flow is within normal limits. 10 mm and 6 mm hyperechoic structures in the right ovary possibly small dermoids, previously measuring 4 mm and 8 mm. OTHER:  No free fluid or loculated fluid collections. Impression: Interval removal of previously noted large left ovarian dermoid. Small dermoids in the right ovarian similar in size compared to the prior exam. Follow-up pelvic ultrasound in 6-12 months.     Workstation performed: [id number]

## 2024-04-29 ENCOUNTER — OFFICE VISIT (OUTPATIENT)
Dept: OBGYN CLINIC | Facility: CLINIC | Age: 29
End: 2024-04-29
Payer: COMMERCIAL

## 2024-04-29 VITALS
DIASTOLIC BLOOD PRESSURE: 74 MMHG | BODY MASS INDEX: 27.05 KG/M2 | HEIGHT: 62 IN | WEIGHT: 147 LBS | SYSTOLIC BLOOD PRESSURE: 120 MMHG

## 2024-04-29 DIAGNOSIS — R10.2 PELVIC PAIN: ICD-10-CM

## 2024-04-29 DIAGNOSIS — Z01.818 PRE-OP EXAMINATION: Primary | ICD-10-CM

## 2024-04-29 DIAGNOSIS — Z30.41 ENCOUNTER FOR SURVEILLANCE OF CONTRACEPTIVE PILLS: ICD-10-CM

## 2024-04-29 DIAGNOSIS — N80.9 ENDOMETRIOSIS: ICD-10-CM

## 2024-04-29 DIAGNOSIS — N94.6 DYSMENORRHEA: ICD-10-CM

## 2024-04-29 PROCEDURE — 99214 OFFICE O/P EST MOD 30 MIN: CPT | Performed by: OBSTETRICS & GYNECOLOGY

## 2024-04-29 NOTE — PATIENT INSTRUCTIONS
[email or web address]    Physical Therapy at [facility]’s team of trained female therapists offer a wide variety of services designed to address the special healthcare needs of women. Our specially trained clinicians work with you to address your concerns and come beside you to support and encourage you through the healing process. Our offices are designed to keep your sensitive treatments private at all times. We are here to ensure your comfort and mentor you through our pelvic floor therapy programs at your pace.    Our female team of experts treat the following conditions faced by women:      Urinary or bowel incontinence  Urinary urgency or frequency  Painful [place]  Painful Bladder Syndrome  Overactive Bladder  Constipation  Pelvic Girdle Pain             Sacroiliac Dysfunction   Gynecological Cancers    Pregnancy & Postpartum related discomfort  Buttock/Tailbone Pain                       Lumbar/Thoracic Pain  Hip Abnormalities/Pain    Scar Adhesions  Diastasis Recti  Osteoporosis          /Episiotomy Recovery  Vulvodynia  Pelvic Pain

## 2024-04-29 NOTE — PROGRESS NOTES
Assessment/Plan    Marta was seen today for consult.    Diagnoses and all orders for this visit:    Pre-op examination    Encounter for surveillance of contraceptive pills    Pelvic pain    Dysmenorrhea    Endometriosis         Plan: Exam under anesthesia, diagnostic laparoscopy, possible excision of endometriosis, chromopertubation and all other indicated procedures.  Will send chart to surgical scheduler.   Strongly encouraged pelvic floor PT, can start anytime.   Consider back to back OCPs, skipping placebo for better control of symptoms.   Discussed chromopertubation at time of laparoscopy to assess for tubal patency.        Subjective     Marta Solorzano is a 28 y.o.  female here for a follow-up visit.  She has been followed by Dr Soto. She had a diagnostic laparoscopy in the past for a dermoid cyst and there was endometriosis seen, however a biopsy done was negative for endometriosis. She has been on birth control for 15 years.     Her symptoms include pain with sex, large clotting, pain before, after and during her period. She can't leave the house due to the pain. She has significant clotting. She missed a period 2 months ago and then her last period was black blood with large clots. She has soreness across her entire pelvis, tight, sore feeling.   She has issues with constipation and stomach issues. It hurts to urinate when she is on her period, she feels pulling and pain sensation in her pelvis. She gets distended prior to her period.     She was on Yoly/BeYaz for many years, and stopped it last year and started Blisovi. The last 2 years her symptoms have progressed over the past two years.     Patient is interested in diagnostic laparoscopy to see where things are at. She is interested in future pregnancy and is concerned about that.     I highly encouraged pelvic floor therapy as this will be beneficial to the patient. She is agreeable.     I also discussed that back to back pills  without placebo may be her best chance at symptom control.     The risks, benefits and alternatives to the procedure were discussed.  We discussed the risks of pain, bleeding, blood clot, infection, allergic reaction, neurovascular injury, injury to uterus, injury to surrounding structures such as bowel, bladder and/or ureters, and possibility of inability to complete the procedure.  We discussed code status -full code.  Blood transfusion is acceptable.  We discussed resident physician participation in the procedure, including pelvic exam.  All questions answered, consent obtained.        Patient Active Problem List   Diagnosis    Irritable bowel syndrome with both constipation and diarrhea    Migraine       Gynecologic History  No LMP recorded. (Menstrual status: Birth Control).  Contraception: OCP (estrogen/progesterone)    Past Medical History:   Diagnosis Date    Anxiety     History of transfusion     Age 4-as a child refused to eat,anemia    Irritable bowel syndrome     Migraine 01/01/2010    Have always had them but have increased in the last three mo    Migraines     Ovarian cyst     LEFT     Rash     Wears glasses      Past Surgical History:   Procedure Laterality Date    COLONOSCOPY      CYSTECTOMY      removed from ovary     PELVIC LAPAROSCOPY Left 6/13/2019    Procedure: LAPAROSCOPIC OVARIAN CYSTECTOMY;  Surgeon: Scarlett Soto DO;  Location[facility];  Service: Gynecology    UPPER GASTROINTESTINAL ENDOSCOPY      WISDOM TOOTH EXTRACTION       Family History   Problem Relation Age of Onset    Asthma Mother     Hypertension Father     Hyperlipidemia Father     Irritable bowel syndrome Brother     Asthma Brother     Breast cancer Maternal Grandmother     No Known Problems Maternal Grandfather     Cancer Paternal Grandmother     Heart attack Paternal Grandfather      Social History     Socioeconomic History    Marital status: Single     Spouse name: Not on file    Number of children: Not on file     Years of education: Not on file    Highest education level: Not on file   Occupational History     Employer: [employer]   Tobacco Use    Smoking status: Never    Smokeless tobacco: Never   Vaping Use    Vaping status: Never Used   Substance and Sexual Activity    Alcohol use: Yes     Alcohol/week: 3.0 standard drinks of alcohol     Types: 3 Glasses of wine per week     Comment: Occs     Drug use: Yes     Types: Marijuana     Comment: Card     Sexual activity: Yes     Partners: Male     Birth control/protection: OCP   Other Topics Concern    Not on file   Social History Narrative    Brushes teeth daily; regular dental care    Lives with parents/grandparents    Sleeps 8-10 hours a day        Do you have pets? none Is pet allowed in bedroom? N/A    Are you a smoker? Never    Does anyone smoke in your home? No       Do you live with smokers? No    Travel [place] frequently? No   How many times a year?      Social Determinants of Health     Financial Resource Strain: Not on file   Food Insecurity: Not on file   Transportation Needs: Not on file   Physical Activity: Not on file   Stress: Not on file   Social Connections: Not on file   Intimate Partner Violence: Not on file   Housing Stability: Not on file     Amoxicillin    Current Outpatient Medications:     albuterol (ProAir HFA) 90 mcg/act inhaler, Inhale 2 puffs every 4 (four) hours as needed for wheezing, Disp: 18 g, Rfl: 3    norethindrone-ethinyl estradiol-ferrous fumarate (Blisovi 24 Fe) 1-20 MG-MCG(24) per tablet, Take 1 tablet by mouth daily, Disp: 84 tablet, Rfl: 4    Probiotic Product (PROBIOTIC-10 PO), Take by mouth, Disp: , Rfl:     rizatriptan (MAXALT) 10 mg tablet, Take 1 tablet (10 mg total) by mouth once as needed for migraine for up to 1 dose May repeat in 2 hours if needed, Disp: 9 tablet, Rfl: 3      Review of Systems  See HPI.  Review of Systems   Gastrointestinal:  Positive for abdominal distention, abdominal pain and constipation.  "  Genitourinary:  Positive for difficulty urinating, menstrual problem, pelvic pain and vaginal bleeding.         Objective     /74 (BP Location: Right arm, Patient Position: Sitting, Cuff Size: Standard)   Ht 5' 2\" (1.575 m)   Wt 66.7 kg (147 lb)   BMI 26.89 kg/m²     Physical Exam  Constitutional:       General: She is not in acute distress.     Appearance: Normal appearance. She is well-developed. She is not ill-appearing.   Cardiovascular:      Rate and Rhythm: Normal rate and regular rhythm.   Pulmonary:      Effort: Pulmonary effort is normal. No respiratory distress.      Breath sounds: Normal breath sounds.   Abdominal:      General: There is distension.      Tenderness: There is abdominal tenderness.   Musculoskeletal:      Cervical back: Normal range of motion.      Right lower leg: No edema.      Left lower leg: No edema.   Neurological:      General: No focal deficit present.      Mental Status: She is alert and oriented to person, place, and time.   Skin:     General: Skin is warm and dry.   Psychiatric:         Mood and Affect: Mood normal.         Behavior: Behavior normal.         Thought Content: Thought content normal.         Judgment: Judgment normal.   Vitals and nursing note reviewed.                           "

## 2024-05-01 ENCOUNTER — TELEPHONE (OUTPATIENT)
Dept: OBGYN CLINIC | Facility: CLINIC | Age: 29
End: 2024-05-01

## 2024-05-01 NOTE — TELEPHONE ENCOUNTER
Called pt, no answer.   Left VM for pt to return my call @ direct # [phone] to discuss scheduling

## 2024-05-01 NOTE — TELEPHONE ENCOUNTER
----- Message from Bernadette Cunningham DO sent at 2024 12:14 PM EDT -----  [facility] GYN Department  Surgery Scheduling Sheet    Patient Name: Marta Solorzano  : 1995    Provider: Bernadette Cunningham DO     Needed: no; Language: N/A    Procedure: exam under anesthesia, diagnostic laparoscopy, possible excision of endometriosis, chromopertubation and all other indicated procedures.     Diagnosis: dysmenorrhea, pelvic pain, endometriosis    Special Needs or Equipment: none    Anesthesia: General anesthesia    Length of stay: outpatient  Does patient have comorbid conditions that will require close perioperative monitoring prior to safe discharge: no    The patient has comorbid conditions that will require close perioperative monitoring prior to safe discharge, including N/A.   This may require acute care beyond the usual and routine recovery period. As such, inpatient admission post-operatively is expected and appropriate, and anticipated hospital length of stay will be >2 midnights.    Pre-Admission Testing Needed: no   Labs that should be ordered: NA    Order PAT that is recommended in prep for procedure?: Not Indicated    Medical Clearance Needed: no; Provider: N/A    MA Form Signed (tubals/hysterectomy): Not Indicated    Surgical Drink Given: no     How many days out of work: 5 day(s)     How many days no drivin day(s)       Is pre op appt needed?  no  Interval for post op appt: 2 week(s)   CONSENT COMPLETED 2024    For Surgical Scheduler:     Surgery Scheduled On:  [place]: [facility], [facility], and [facility]    Pre-op Appt:   Post op Appt:  Consult/Medical clearance appt:

## 2024-05-09 NOTE — TELEPHONE ENCOUNTER
2nd attempt  Called pt, no answer.   Left VM for pt to return my call @ direct # [phone] to discuss scheduling

## 2024-08-19 DIAGNOSIS — N92.0 MENORRHAGIA WITH REGULAR CYCLE: ICD-10-CM

## 2024-08-19 RX ORDER — NORETHINDRONE ACETATE AND ETHINYL ESTRADIOL 1MG-20(24)
1 KIT ORAL DAILY
Qty: 84 TABLET | Refills: 2 | Status: SHIPPED | OUTPATIENT
Start: 2024-08-19

## 2024-11-07 ENCOUNTER — PATIENT MESSAGE (OUTPATIENT)
Dept: FAMILY MEDICINE CLINIC | Facility: CLINIC | Age: 29
End: 2024-11-07

## 2024-11-07 ENCOUNTER — NURSE TRIAGE (OUTPATIENT)
Dept: OTHER | Facility: OTHER | Age: 29
End: 2024-11-07

## 2024-11-07 NOTE — TELEPHONE ENCOUNTER
No available appointments with  for the next few weeks. Patient placed a video in her portal. Please review and advise.

## 2024-11-07 NOTE — TELEPHONE ENCOUNTER
"Reason for Disposition  • [1] Abdomen BLOATING (e.g., feeling full or gassy; no visible swelling) lasts > 1 week AND [2] no improvement after using Care Advice    Answer Assessment - Initial Assessment Questions  1. SYMPTOM: \"What's the main symptom you're concerned about?\" (e.g., abdomen bloating, swelling)      Abdomen bloating, tender to touch in a left lower abdomen.   2. ONSET: \"When did bloating and tenderness start?\"      A month ago   3. SEVERITY: \"How bad is the bloating or swelling?\"      Mild   4. ABDOMEN PAIN:  \"Is there any abdomen pain?\" If Yes, ask: \"How bad is the pain?\"  (e.g., Scale 1-10; mild, moderate, or severe)      No   5. RELIEVING AND AGGRAVATING FACTORS: \"What makes it better or worse?\" (e.g., certain foods, lactose, medicines)      None   6. GI HISTORY: \"Do you have any history of stomach or intestine problems?\" (e.g., bowel obstruction, cancer, irritable bowel)       No   7. CAUSE: \"What do you think is causing the bloating?\"       Patient dx of endometriosis   8. OTHER SYMPTOMS: \"Do you have any other symptoms?\" (e.g., belching, blood in stool, breathing difficulty, constipation, diarrhea, fever, passing gas, vomiting, weight loss, white of eyes have turned yellow)      No   9. PREGNANCY: \"Is there any chance you are pregnant?\" \"When was your last menstrual period?\"      No, LMP 3 weeks ago.    Protocols used: Abdomen Bloating and Swelling-Adult-AH    "

## 2024-11-08 ENCOUNTER — OFFICE VISIT (OUTPATIENT)
Dept: FAMILY MEDICINE CLINIC | Facility: CLINIC | Age: 29
End: 2024-11-08
Payer: COMMERCIAL

## 2024-11-08 ENCOUNTER — APPOINTMENT (OUTPATIENT)
Dept: LAB | Age: 29
End: 2024-11-08
Payer: COMMERCIAL

## 2024-11-08 VITALS
TEMPERATURE: 98.7 F | DIASTOLIC BLOOD PRESSURE: 80 MMHG | BODY MASS INDEX: 27.6 KG/M2 | RESPIRATION RATE: 17 BRPM | HEIGHT: 62 IN | HEART RATE: 88 BPM | SYSTOLIC BLOOD PRESSURE: 120 MMHG | WEIGHT: 150 LBS | OXYGEN SATURATION: 100 %

## 2024-11-08 DIAGNOSIS — R10.32 LLQ PAIN: Primary | ICD-10-CM

## 2024-11-08 DIAGNOSIS — G43.009 MIGRAINE WITHOUT AURA AND WITHOUT STATUS MIGRAINOSUS, NOT INTRACTABLE: ICD-10-CM

## 2024-11-08 LAB
ALBUMIN SERPL BCG-MCNC: 4.6 G/DL (ref 3.5–5)
ALP SERPL-CCNC: 57 U/L (ref 34–104)
ALT SERPL W P-5'-P-CCNC: 10 U/L (ref 7–52)
ANION GAP SERPL CALCULATED.3IONS-SCNC: 9 MMOL/L (ref 4–13)
AST SERPL W P-5'-P-CCNC: 16 U/L (ref 13–39)
BILIRUB SERPL-MCNC: 0.34 MG/DL (ref 0.2–1)
BUN SERPL-MCNC: 14 MG/DL (ref 5–25)
CALCIUM SERPL-MCNC: 11.3 MG/DL (ref 8.4–10.2)
CHLORIDE SERPL-SCNC: 104 MMOL/L (ref 96–108)
CO2 SERPL-SCNC: 26 MMOL/L (ref 21–32)
CREAT SERPL-MCNC: 0.89 MG/DL (ref 0.6–1.3)
GFR SERPL CREATININE-BSD FRML MDRD: 88 ML/MIN/1.73SQ M
GLUCOSE SERPL-MCNC: 90 MG/DL (ref 65–140)
POTASSIUM SERPL-SCNC: 3.9 MMOL/L (ref 3.5–5.3)
PROT SERPL-MCNC: 7.6 G/DL (ref 6.4–8.4)
SODIUM SERPL-SCNC: 139 MMOL/L (ref 135–147)

## 2024-11-08 PROCEDURE — 36415 COLL VENOUS BLD VENIPUNCTURE: CPT | Performed by: FAMILY MEDICINE

## 2024-11-08 PROCEDURE — 80053 COMPREHEN METABOLIC PANEL: CPT | Performed by: FAMILY MEDICINE

## 2024-11-08 PROCEDURE — 99214 OFFICE O/P EST MOD 30 MIN: CPT | Performed by: FAMILY MEDICINE

## 2024-11-08 NOTE — PROGRESS NOTES
Ambulatory Visit  Name: Marta Solorzano      : 1995      MRN: [id number]  Encounter Provider: Kyra Dee MD  Encounter Date: 2024   [facility]    Assessment & Plan  LLQ pain  To r/o colitis vs diverticulitis   To avoid constipation     Orders:    CT abdomen pelvis w contrast; Future    Comprehensive metabolic panel    Migraine without aura and without status migrainosus, not intractable  Stable  Maxalt prn               History of Present Illness     HPI  Here for left lower abdominal pain just above her cystectomy scar before her periods for 6 months   Feels hot and swollen on and off through out the menstrual cycle  More bloated feeling at times   + constipation  She has h/o endometriosis     Review of Systems   Constitutional: Negative.    HENT: Negative.     Respiratory: Negative.     Gastrointestinal:  Positive for abdominal pain and constipation. Negative for nausea.   Hematological: Negative.      Past Medical History:   Diagnosis Date    Anxiety     History of transfusion     Age 4-as a child refused to eat,anemia    Irritable bowel syndrome     Migraine 2010    Have always had them but have increased in the last three mo    Migraines     Ovarian cyst     LEFT     Rash     Wears glasses      Past Surgical History:   Procedure Laterality Date    COLONOSCOPY      CYSTECTOMY      removed from ovary     PELVIC LAPAROSCOPY Left 2019    Procedure: LAPAROSCOPIC OVARIAN CYSTECTOMY;  Surgeon: Scarlett Soto DO;  [facility];  Service: Gynecology    UPPER GASTROINTESTINAL ENDOSCOPY      WISDOM TOOTH EXTRACTION       Family History   Problem Relation Age of Onset    Asthma Mother     Hypertension Father     Hyperlipidemia Father     Irritable bowel syndrome Brother     Asthma Brother     Breast cancer Maternal Grandmother     No Known Problems Maternal Grandfather     Cancer Paternal Grandmother     Heart attack Paternal  Grandfather      Social History     Tobacco Use    Smoking status: Never     Passive exposure: Never    Smokeless tobacco: Never   Vaping Use    Vaping status: Never Used   Substance and Sexual Activity    Alcohol use: Yes     Alcohol/week: 3.0 standard drinks of alcohol     Types: 3 Glasses of wine per week     Comment: Occs     Drug use: Yes     Types: Marijuana     Comment: Card     Sexual activity: Yes     Partners: Male     Birth control/protection: OCP     Current Outpatient Medications on File Prior to Visit   Medication Sig    albuterol (ProAir HFA) 90 mcg/act inhaler Inhale 2 puffs every 4 (four) hours as needed for wheezing    norethindrone-ethinyl estradiol-ferrous fumarate (Blisovi 24 Fe) 1-20 MG-MCG(24) per tablet Take 1 tablet by mouth daily    Probiotic Product (PROBIOTIC-10 PO) Take by mouth    rizatriptan (MAXALT) 10 mg tablet Take 1 tablet (10 mg total) by mouth once as needed for migraine for up to 1 dose May repeat in 2 hours if needed     Allergies   Allergen Reactions    Amoxicillin GI Intolerance     Immunization History   Administered Date(s) Administered    COVID-19 PFIZER VACCINE 0.3 ML IM 04/27/2021, 05/21/2021, 01/09/2022    DTaP 5 1995, 1995, 02/11/1997, 08/13/1998, 08/06/2001    HPV Quadrivalent 08/13/2014, 03/06/2015    Hep A, adult 08/13/2014, 03/06/2015    Hep B, adult 1995, 1995, 02/11/1997    Hib (PRP-OMP) 1995, 1995, 02/11/1997, 08/13/1998    Influenza, injectable, quadrivalent, preservative free 0.5 mL 10/12/2020, 12/09/2022    MMR 11/19/1997, 08/06/2001    Meningococcal, Unknown Serogroups 02/19/2009, 08/13/2014    OPV 1995, 01/09/1997, 08/13/1998, 08/06/2001    Tdap 03/22/2007, 10/12/2020    Tuberculin Skin Test-PPD Intradermal 08/06/2001, 08/13/2014    Varicella 11/19/1997, 03/22/2007     Objective     /80 (BP Location: Left arm, Patient Position: Sitting, Cuff Size: Standard)   Pulse 88   Temp 98.7 °F (37.1 °C) (Tympanic)    "Resp 17   Ht 5' 2\" (1.575 m)   Wt 68 kg (150 lb)   SpO2 100%   BMI 27.44 kg/m²     Physical Exam  Constitutional:       Appearance: Normal appearance.   Cardiovascular:      Rate and Rhythm: Normal rate and regular rhythm.   Pulmonary:      Effort: Pulmonary effort is normal.      Breath sounds: Normal breath sounds.   Abdominal:      General: Bowel sounds are normal.      Tenderness: There is abdominal tenderness.      Comments: LLQ    Neurological:      Mental Status: She is alert.   Psychiatric:         Mood and Affect: Mood normal.         Behavior: Behavior normal.       I have spent a total time of 25 minutes in caring for this patient on the day of the visit/encounter including Risks and benefits of tx options, Instructions for management, Impressions, Counseling / Coordination of care, Documenting in the medical record, and Reviewing / ordering tests, medicine, procedures  .   "

## 2024-11-11 DIAGNOSIS — E83.52 HYPERCALCEMIA: Primary | ICD-10-CM

## 2024-11-12 ENCOUNTER — APPOINTMENT (OUTPATIENT)
Dept: LAB | Age: 29
End: 2024-11-12
Payer: COMMERCIAL

## 2024-11-12 DIAGNOSIS — E83.52 HYPERCALCEMIA: ICD-10-CM

## 2024-11-12 LAB
CALCIUM SERPL-MCNC: 9.7 MG/DL (ref 8.4–10.2)
PTH-INTACT SERPL-MCNC: 97.3 PG/ML (ref 12–88)

## 2024-11-12 PROCEDURE — 36415 COLL VENOUS BLD VENIPUNCTURE: CPT

## 2024-11-12 PROCEDURE — 83970 ASSAY OF PARATHORMONE: CPT

## 2024-11-13 ENCOUNTER — RESULTS FOLLOW-UP (OUTPATIENT)
Dept: FAMILY MEDICINE CLINIC | Facility: CLINIC | Age: 29
End: 2024-11-13

## 2024-11-13 DIAGNOSIS — E21.3 HYPERPARATHYROIDISM (HCC): Primary | ICD-10-CM

## 2024-11-18 ENCOUNTER — HOSPITAL ENCOUNTER (OUTPATIENT)
Dept: RADIOLOGY | Age: 29
Discharge: HOME | End: 2024-11-18
Payer: COMMERCIAL

## 2024-11-18 DIAGNOSIS — R10.32 LLQ PAIN: ICD-10-CM

## 2024-11-18 PROCEDURE — 74177 CT ABD & PELVIS W/CONTRAST: CPT

## 2024-11-18 RX ADMIN — IOHEXOL 100 ML: 350 INJECTION, SOLUTION INTRAVENOUS at 08:48

## 2024-11-19 ENCOUNTER — RESULTS FOLLOW-UP (OUTPATIENT)
Dept: FAMILY MEDICINE CLINIC | Facility: CLINIC | Age: 29
End: 2024-11-19

## 2024-11-19 ENCOUNTER — APPOINTMENT (OUTPATIENT)
Dept: LAB | Facility: CLINIC | Age: 29
End: 2024-11-19
Payer: COMMERCIAL

## 2024-11-19 ENCOUNTER — OFFICE VISIT (OUTPATIENT)
Dept: ENDOCRINOLOGY | Facility: CLINIC | Age: 29
End: 2024-11-19
Payer: COMMERCIAL

## 2024-11-19 VITALS
SYSTOLIC BLOOD PRESSURE: 122 MMHG | DIASTOLIC BLOOD PRESSURE: 80 MMHG | BODY MASS INDEX: 26.97 KG/M2 | HEIGHT: 63 IN | WEIGHT: 152.2 LBS

## 2024-11-19 DIAGNOSIS — R79.89 INCREASED PTH LEVEL: ICD-10-CM

## 2024-11-19 DIAGNOSIS — R79.89 INCREASED PTH LEVEL: Primary | ICD-10-CM

## 2024-11-19 DIAGNOSIS — K76.9 LIVER LESION: Primary | ICD-10-CM

## 2024-11-19 DIAGNOSIS — E21.3 HYPERPARATHYROIDISM (HCC): ICD-10-CM

## 2024-11-19 LAB
25(OH)D3 SERPL-MCNC: 23.7 NG/ML (ref 30–100)
CA-I BLD-SCNC: 1.33 MMOL/L (ref 1.12–1.32)
MAGNESIUM SERPL-MCNC: 2.1 MG/DL (ref 1.9–2.7)
PHOSPHATE SERPL-MCNC: 2.1 MG/DL (ref 2.7–4.5)
TSH SERPL DL<=0.05 MIU/L-ACNC: 1.06 UIU/ML (ref 0.45–4.5)

## 2024-11-19 PROCEDURE — 36415 COLL VENOUS BLD VENIPUNCTURE: CPT

## 2024-11-19 PROCEDURE — 82306 VITAMIN D 25 HYDROXY: CPT

## 2024-11-19 PROCEDURE — 84443 ASSAY THYROID STIM HORMONE: CPT

## 2024-11-19 PROCEDURE — 83735 ASSAY OF MAGNESIUM: CPT

## 2024-11-19 PROCEDURE — 84100 ASSAY OF PHOSPHORUS: CPT

## 2024-11-19 PROCEDURE — 82330 ASSAY OF CALCIUM: CPT

## 2024-11-19 PROCEDURE — 99204 OFFICE O/P NEW MOD 45 MIN: CPT | Performed by: STUDENT IN AN ORGANIZED HEALTH CARE EDUCATION/TRAINING PROGRAM

## 2024-11-19 NOTE — PROGRESS NOTES
Name: Marta Solorzano      : 1995      MRN: [id number]  Encounter Provider: Lauryn Parkinson MD  Encounter Date: 2024   Encounter department: [facility]  :  Assessment & Plan  Hyperparathyroidism (HCC)    Orders:    Ambulatory Referral to Endocrinology    Increased PTH level  Discussed physiology of parathyroid gland and importance of PTH, including its management of calcium, phosphorus, and vitamin D levels.  Discussed different conditions or variables that could affect PTH and/or calcium levels, differential include primary vs secondary PTH, vitamin D deficiency, dehydration  - explained further evaluation with blood work is required, patient agreeable    Orders:    Magnesium; Future    Phosphorus; Future    Calcium, ionized; Future    Vitamin D 25 hydroxy; Future    TSH, 3rd generation with Free T4 reflex; Future        History of Present Illness     HPI  Marta Solorzano is a 29 y.o. female with history of endometriosis and migraines who was referred to primary care physician for evaluation of hyperparathyroidism.  He has been going evaluation of her endometriosis which included CT of the abdomen and pelvis as well as complete metabolic panel.  Labs showed elevated calcium subsequent retesting showed normal calcium but elevated PTH level.  She reports a few months of fatigue, constipation, brain fog, flushing, and anxiety.  She attributes this to her endometriosis and chronic history of anxiety.   Aside from oral contraceptive pill and migraine abortive as needed, she denies any new medications.  She does not take any vitamins or supplements.    She reports having a regular diet that includes meat, vegetables, dairy products, and carbohydrates.   Denies history of reflux or heartburn, kidney stones, bone fracture, and dental issues.   She denies palpitations, mood changes, bone pain, numbness tingling, difficulty walking, urinary frequency  or urgency, vision or hearing change, and headache.    FH:  To her knowledge there is no family history of pituitary disease, thyroid disease/cancer, pancreatic cancer, or elevated calcium levels.     Mom- breast cancer  Dad- htn, HLD    History obtained from: patient    Review of Systems   Constitutional:  Positive for fatigue. Negative for activity change, appetite change, chills, diaphoresis, fever and unexpected weight change.   HENT:  Negative for dental problem, ear pain, sore throat, trouble swallowing and voice change.    Eyes:  Negative for pain and visual disturbance.   Respiratory:  Negative for cough, choking and shortness of breath.    Cardiovascular:  Negative for chest pain and palpitations.   Gastrointestinal:  Positive for constipation. Negative for abdominal pain, diarrhea, nausea and vomiting.   Endocrine: Negative for cold intolerance, polydipsia and polyuria.   Genitourinary:  Negative for dysuria, frequency, hematuria and urgency.   Musculoskeletal:  Negative for arthralgias, back pain, gait problem, joint swelling, myalgias and neck pain.   Skin:  Negative for color change and rash.   Neurological:  Negative for dizziness, tremors, seizures, syncope, weakness, light-headedness and headaches.   Hematological:  Does not bruise/bleed easily.   Psychiatric/Behavioral:  Positive for decreased concentration. Negative for behavioral problems, confusion, dysphoric mood and sleep disturbance. The patient is not nervous/anxious and is not hyperactive.    All other systems reviewed and are negative.    Social History     Tobacco Use    Smoking status: Never     Passive exposure: Never    Smokeless tobacco: Never   Vaping Use    Vaping status: Never Used   Substance and Sexual Activity    Alcohol use: Yes     Alcohol/week: 3.0 standard drinks of alcohol     Types: 3 Glasses of wine per week     Comment: Occs     Drug use: Yes     Types: Marijuana     Comment: Card     Sexual activity: Yes     Partners:  "Male     Birth control/protection: OCP        Objective   /80 (BP Location: Left arm, Patient Position: Sitting, Cuff Size: Adult)   Ht 5' 2.5\" (1.588 m)   Wt 69 kg (152 lb 3.2 oz)   BMI 27.39 kg/m²      Physical Exam  Vitals reviewed.   Constitutional:       General: She is not in acute distress.     Appearance: Normal appearance. She is well-developed and normal weight.   HENT:      Head: Normocephalic and atraumatic.      Nose: Nose normal.      Mouth/Throat:      Mouth: Mucous membranes are moist.      Pharynx: Oropharynx is clear.   Eyes:      Extraocular Movements: Extraocular movements intact.      Conjunctiva/sclera: Conjunctivae normal.   Neck:      Thyroid: No thyroid mass, thyromegaly or thyroid tenderness.      Trachea: Trachea normal.   Cardiovascular:      Rate and Rhythm: Normal rate and regular rhythm.      Pulses: Normal pulses.      Heart sounds: No murmur heard.  Pulmonary:      Effort: Pulmonary effort is normal. No respiratory distress.      Breath sounds: Normal breath sounds.   Abdominal:      General: Abdomen is flat.      Palpations: Abdomen is soft.   Musculoskeletal:         General: No swelling. Normal range of motion.      Cervical back: Normal range of motion and neck supple.   Lymphadenopathy:      Cervical: No cervical adenopathy.   Skin:     General: Skin is warm and dry.      Capillary Refill: Capillary refill takes less than 2 seconds.   Neurological:      Mental Status: She is alert and oriented to person, place, and time.      Sensory: Sensation is intact.      Motor: Motor function is intact. No tremor or abnormal muscle tone.      Coordination: Coordination is intact.      Gait: Gait is intact.   Psychiatric:         Mood and Affect: Mood normal.         Behavior: Behavior normal.         Thought Content: Thought content normal.         Judgment: Judgment normal.           "

## 2024-11-25 ENCOUNTER — RESULTS FOLLOW-UP (OUTPATIENT)
Dept: ENDOCRINOLOGY | Facility: CLINIC | Age: 29
End: 2024-11-25

## 2024-11-25 DIAGNOSIS — R79.89 INCREASED PTH LEVEL: Primary | ICD-10-CM

## 2024-11-25 DIAGNOSIS — E55.9 VITAMIN D INSUFFICIENCY: ICD-10-CM

## 2024-12-31 ENCOUNTER — HOSPITAL ENCOUNTER (OUTPATIENT)
Dept: RADIOLOGY | Facility: HOSPITAL | Age: 29
Discharge: HOME | End: 2024-12-31
Payer: COMMERCIAL

## 2024-12-31 DIAGNOSIS — K76.9 LIVER LESION: ICD-10-CM

## 2024-12-31 PROCEDURE — 74183 MRI ABD W/O CNTR FLWD CNTR: CPT

## 2024-12-31 PROCEDURE — A9585 GADOBUTROL INJECTION: HCPCS | Performed by: FAMILY MEDICINE

## 2024-12-31 RX ORDER — GADOBUTROL 604.72 MG/ML
7 INJECTION INTRAVENOUS
Status: COMPLETED | OUTPATIENT
Start: 2024-12-31 | End: 2024-12-31

## 2024-12-31 RX ADMIN — GADOBUTROL 7 ML: 604.72 INJECTION INTRAVENOUS at 06:26

## 2025-01-06 ENCOUNTER — TELEPHONE (OUTPATIENT)
Age: 30
End: 2025-01-06

## 2025-01-06 NOTE — TELEPHONE ENCOUNTER
Patient calling for MRI abdomen results. They are not back as of yet.  She had done at [place].     Please call when results are final.  Thank you

## 2025-01-07 ENCOUNTER — RESULTS FOLLOW-UP (OUTPATIENT)
Dept: FAMILY MEDICINE CLINIC | Facility: CLINIC | Age: 30
End: 2025-01-07

## 2025-01-07 DIAGNOSIS — K76.9 LIVER LESION: Primary | ICD-10-CM

## 2025-01-23 ENCOUNTER — OFFICE VISIT (OUTPATIENT)
Age: 30
End: 2025-01-23
Payer: COMMERCIAL

## 2025-01-23 VITALS
HEIGHT: 62 IN | HEART RATE: 68 BPM | DIASTOLIC BLOOD PRESSURE: 70 MMHG | BODY MASS INDEX: 27.94 KG/M2 | WEIGHT: 151.8 LBS | SYSTOLIC BLOOD PRESSURE: 110 MMHG

## 2025-01-23 DIAGNOSIS — K76.9 LIVER LESION: ICD-10-CM

## 2025-01-23 PROCEDURE — 99204 OFFICE O/P NEW MOD 45 MIN: CPT | Performed by: STUDENT IN AN ORGANIZED HEALTH CARE EDUCATION/TRAINING PROGRAM

## 2025-01-23 PROCEDURE — G2211 COMPLEX E/M VISIT ADD ON: HCPCS | Performed by: STUDENT IN AN ORGANIZED HEALTH CARE EDUCATION/TRAINING PROGRAM

## 2025-01-23 NOTE — PROGRESS NOTES
[facility] - Outpatient Consultation  Marta Solorzano 29 y.o. female MRN: [id number]  Encounter: [id number]    PCP:  Kyra Dee MD, [phone]  Referring Provider:  Kyra Dee MD, [phone]      Name: Marta Solorzano      : 1995      MRN: [id number]  Encounter Provider: Michelle Stevens MD  Encounter Date: 2025   Encounter department: [facility] [address]  :  Assessment & Plan  Liver lesion  29 y.o. female with history of IBS, migraines and endometriosis who presents for initial evaluation for a liver lesion.    She was incidentally found to have a pedunculated heterogeneously enhancing lesion in segment 6 measuring 5.1 cm on CT for work-up of LLQ abdominal pain. She had a subsequent MRI with contrast which showed a 5.4 cm inferior R hepatic mass with heterogenous arterial phase enhancement with persistent hyperenhancement in the delayed phases for which HCA vs. FNH. She is taking birth control pills with low dose estrogen for the last 15 years.    Would recommend an MRI with Eovist to further characterize her lesion. Discussed that if she does have an HCA, would recommend stopping her OCPs and referring to surgical oncology for possible resection if it does not regress given that the lesion is greater than 5 cm. If her lesion is an FNH, can consider imaging surveillance at 6-12 month intervals to confirm stability, although we did discuss that her symptoms are atypical for this.     Lastly, if imaging is unable to distinguish the two, would recommend IR referral for biopsy and will also review at tumor board.    She will return to clinic in 3 months or sooner if needed. Thank you for the opportunity to consult in her care.    - MRI abdomen with Eovist     Michelle Stevens MD   [facility]  Gastroenterology and Hepatology      Orders:    Ambulatory Referral to Gastroenterology        History of Present Illness    HPI  Marta Solorzano is a 29 y.o. female with history of IBS, migraines and endometriosis who presents for initial evaluation for a liver lesion.    She was incidentally found to have a pedunculated heterogeneously enhancing lesion in segment 6 measuring 5.1 cm on CT for work-up of LLQ abdominal pain. She had a subsequent MRI with contrast which showed a 5.4 cm inferior R hepatic mass with heterogenous arterial phase enhancement with persistent hyperenhancement in the delayed phases for which HCA vs. FNH. She is taking birth control pills with low dose estrogen for the last 15 years.    She reports a family history of EtOH-related cirrhosis but denies history of HCC. She denies excessive EtOH consumption and recreational drug use.     PAST MEDICAL/SURGICAL HISTORY:  Past Medical History:   Diagnosis Date    Anxiety     History of transfusion     Age 4-as a child refused to eat,anemia    Irritable bowel syndrome     Migraine 01/01/2010    Have always had them but have increased in the last three mo    Migraines     Ovarian cyst     LEFT     Rash     Wears glasses         Past Surgical History:   Procedure Laterality Date    COLONOSCOPY      CYSTECTOMY      removed from ovary     PELVIC LAPAROSCOPY Left 6/13/2019    Procedure: LAPAROSCOPIC OVARIAN CYSTECTOMY;  Surgeon: Scarlett Soto DO;  Location: [facility];  Service: Gynecology    UPPER GASTROINTESTINAL ENDOSCOPY      WISDOM TOOTH EXTRACTION         FAMILY/SOCIAL HISTORY:  Family History   Problem Relation Age of Onset    Asthma Mother     Hypertension Father     Hyperlipidemia Father     Irritable bowel syndrome Brother     Asthma Brother     Breast cancer Maternal Grandmother     No Known Problems Maternal Grandfather     Cancer Paternal Grandmother     Heart attack Paternal Grandfather        Social History     Tobacco Use    Smoking status: Never     Passive exposure: Never    Smokeless tobacco: Never   Vaping Use    Vaping status: Never Used  "  Substance Use Topics    Alcohol use: Yes     Alcohol/week: 3.0 standard drinks of alcohol     Types: 3 Glasses of wine per week     Comment: Occs     Drug use: Yes     Types: Marijuana     Comment: Card        MEDICATIONS:  Current Outpatient Medications on File Prior to Visit   Medication Sig Dispense Refill    albuterol (ProAir HFA) 90 mcg/act inhaler Inhale 2 puffs every 4 (four) hours as needed for wheezing 18 g 3    norethindrone-ethinyl estradiol-ferrous fumarate (Blisovi 24 Fe) 1-20 MG-MCG(24) per tablet Take 1 tablet by mouth daily 84 tablet 2    Probiotic Product (PROBIOTIC-10 PO) Take by mouth      rizatriptan (MAXALT) 10 mg tablet Take 1 tablet (10 mg total) by mouth once as needed for migraine for up to 1 dose May repeat in 2 hours if needed 9 tablet 3     No current facility-administered medications on file prior to visit.       Allergies   Allergen Reactions    Amoxicillin GI Intolerance         History obtained from: patient    Review of Systems   Constitutional: Negative.    HENT: Negative.     Eyes: Negative.    Respiratory: Negative.     Cardiovascular: Negative.    Gastrointestinal:  Positive for abdominal pain. Negative for constipation, diarrhea, nausea and vomiting.   Endocrine: Negative.    Genitourinary: Negative.    Musculoskeletal: Negative.    Skin: Negative.    Allergic/Immunologic: Negative.    Neurological: Negative.    Hematological: Negative.    Psychiatric/Behavioral: Negative.            Objective   /70   Pulse 68   Ht 5' 2\" (1.575 m)   Wt 68.9 kg (151 lb 12.8 oz)   BMI 27.76 kg/m²      Physical Exam  Constitutional:       Appearance: Normal appearance. She is normal weight.   HENT:      Head: Normocephalic and atraumatic.      Mouth/Throat:      Mouth: Mucous membranes are moist.      Pharynx: Oropharynx is clear.   Eyes:      General: No scleral icterus.     Conjunctiva/sclera: Conjunctivae normal.      Pupils: Pupils are equal, round, and reactive to light. "   Abdominal:      General: Abdomen is flat. There is no distension.      Palpations: Abdomen is soft.      Tenderness: There is no abdominal tenderness.   Musculoskeletal:         General: Normal range of motion.      Cervical back: Normal range of motion.      Right lower leg: No edema.      Left lower leg: No edema.   Skin:     General: Skin is warm and dry.   Neurological:      General: No focal deficit present.      Mental Status: She is alert and oriented to person, place, and time.      Comments: No asterixis         CT A/P (11/2024)     1. No acute intra-abdominal disease. Normal caliber bowel loops and appendix. No colitis or enteritis. Normal terminal ileum. No renal stones or hydronephrosis.     2. Incidental note of pedunculated heterogeneously enhancing lesion in the inferior right hepatic lobe segment 6 measuring 5.1 x 4.5 cm possibly hemangioma. This lesion was not visualized on the previous abdominal ultrasound in 2017. MRI abdomen with and   without gadolinium recommended.    MRI abdomen (12/2024)  Exophytic 5.4 cm inferior right hepatic mass with differential diagnosis, in a patient of this age, favoring FNH and hepatic adenoma. Follow-up with repeat MRI of the liver performed with Eovist as the contrast agent.     LABS/RADIOLOGY/ENDOSCOPY:  Lab Results   Component Value Date    WBC 6.48 06/04/2019    HGB 13.3 06/04/2019    HCT 42.1 06/04/2019     06/04/2019    GLUF 75 04/10/2019    BUN 14 11/08/2024    CREATININE 0.89 11/08/2024    K 3.9 11/08/2024     11/08/2024    CO2 26 11/08/2024    BILIDIR 0.07 04/10/2019    ALKPHOS 57 11/08/2024    ALT 10 11/08/2024    AST 16 11/08/2024    CALCIUM 9.7 11/12/2024    EGFR 88 11/08/2024       Computed MELD 3.0 unavailable. One or more values for this score either were not found within the given timeframe or did not fit some other criterion.  Computed MELD-Na unavailable. One or more values for this score either were not found within the given  timeframe or did not fit some other criterion.

## 2025-02-04 ENCOUNTER — TELEPHONE (OUTPATIENT)
Age: 30
End: 2025-02-04

## 2025-02-04 DIAGNOSIS — K76.9 LIVER LESION: Primary | ICD-10-CM

## 2025-02-04 NOTE — TELEPHONE ENCOUNTER
Left message on patient's voice mail. Please complete required blood work prior to 2/12/25 MRI. This is standard testing that needs to be completed with in 90 days of imaging. Thank you

## 2025-02-04 NOTE — TELEPHONE ENCOUNTER
Patients GI provider:  Dr. Stevens    Number to return call: [phone]    Reason for call: Frances calling from [facility] to say the pt needs labs prior to MRI the y are asking for a creatine serum lab. Pt would also need to be notified about the labs being required     Scheduled procedure/appointment date if applicable: Apt/procedure

## 2025-02-05 ENCOUNTER — APPOINTMENT (OUTPATIENT)
Dept: LAB | Age: 30
End: 2025-02-05
Payer: COMMERCIAL

## 2025-02-05 DIAGNOSIS — K76.9 LIVER LESION: ICD-10-CM

## 2025-02-05 LAB
ANION GAP SERPL CALCULATED.3IONS-SCNC: 8 MMOL/L (ref 4–13)
BUN SERPL-MCNC: 12 MG/DL (ref 5–25)
CALCIUM SERPL-MCNC: 10.9 MG/DL (ref 8.4–10.2)
CHLORIDE SERPL-SCNC: 104 MMOL/L (ref 96–108)
CO2 SERPL-SCNC: 25 MMOL/L (ref 21–32)
CREAT SERPL-MCNC: 0.82 MG/DL (ref 0.6–1.3)
GFR SERPL CREATININE-BSD FRML MDRD: 97 ML/MIN/1.73SQ M
GLUCOSE SERPL-MCNC: 78 MG/DL (ref 65–140)
POTASSIUM SERPL-SCNC: 4.4 MMOL/L (ref 3.5–5.3)
SODIUM SERPL-SCNC: 137 MMOL/L (ref 135–147)

## 2025-02-05 PROCEDURE — 80048 BASIC METABOLIC PNL TOTAL CA: CPT

## 2025-02-05 PROCEDURE — 36415 COLL VENOUS BLD VENIPUNCTURE: CPT

## 2025-02-07 ENCOUNTER — ANNUAL EXAM (OUTPATIENT)
Dept: OBGYN CLINIC | Facility: CLINIC | Age: 30
End: 2025-02-07
Payer: COMMERCIAL

## 2025-02-07 VITALS
DIASTOLIC BLOOD PRESSURE: 70 MMHG | SYSTOLIC BLOOD PRESSURE: 120 MMHG | HEIGHT: 62 IN | BODY MASS INDEX: 27.6 KG/M2 | WEIGHT: 150 LBS

## 2025-02-07 DIAGNOSIS — N92.0 MENORRHAGIA WITH REGULAR CYCLE: ICD-10-CM

## 2025-02-07 DIAGNOSIS — Z12.4 ENCOUNTER FOR SCREENING FOR MALIGNANT NEOPLASM OF CERVIX: ICD-10-CM

## 2025-02-07 DIAGNOSIS — D27.0 DERMOID CYST OF RIGHT OVARY: ICD-10-CM

## 2025-02-07 DIAGNOSIS — Z11.51 SCREENING FOR HPV (HUMAN PAPILLOMAVIRUS): ICD-10-CM

## 2025-02-07 DIAGNOSIS — N80.9 ENDOMETRIOSIS: ICD-10-CM

## 2025-02-07 DIAGNOSIS — Z01.419 WELL WOMAN EXAM WITH ROUTINE GYNECOLOGICAL EXAM: Primary | ICD-10-CM

## 2025-02-07 PROBLEM — N80.319: Status: ACTIVE | Noted: 2019-06-13

## 2025-02-07 PROCEDURE — 99395 PREV VISIT EST AGE 18-39: CPT

## 2025-02-07 RX ORDER — NORETHINDRONE ACETATE AND ETHINYL ESTRADIOL 1MG-20(24)
1 KIT ORAL DAILY
Qty: 84 TABLET | Refills: 4 | Status: SHIPPED | OUTPATIENT
Start: 2025-02-07

## 2025-02-07 NOTE — PROGRESS NOTES
ASSESSMENT & PLAN:   Diagnoses and all orders for this visit:    Well woman exam with routine gynecological exam  -     Cancel: IGP, rfx Aptima HPV ASCU  -     IGP, rfx Aptima HPV ASCU    Screening for HPV (human papillomavirus)  -     Cancel: IGP, rfx Aptima HPV ASCU  -     IGP, rfx Aptima HPV ASCU    Encounter for screening for malignant neoplasm of cervix  -     Cancel: IGP, rfx Aptima HPV ASCU  -     IGP, rfx Aptima HPV ASCU    Menorrhagia with regular cycle  -     norethindrone-ethinyl estradiol-ferrous fumarate (Blisovi 24 Fe) 1-20 MG-MCG(24) per tablet; Take 1 tablet by mouth daily  -     US pelvis complete w transvaginal; Future    Endometriosis  -     US pelvis complete w transvaginal; Future    Dermoid cyst of right ovary  -     US pelvis complete w transvaginal; Future      The following were reviewed in today's visit: ASCCP guidelines, Gardisil vaccination, STD testing breast self exam, STD testing, use and side effects of OCPs, exercise, and healthy diet.    Patient to return to office in yearly for annual exam.     All questions have been answered to her satisfaction.    CC:  Annual Gynecologic Examination  Chief Complaint   Patient presents with    Gynecologic Exam     Pt is here for her yearly exam. Pap due.  [facility]   Last pap 2021 NILM    Last pap 2017 NILM   colonoscopy 20 - repeat at age 51         HPI: Marta Solorzano is a 29 y.o.  who presents for annual gynecologic examination.  She has the following concerns:  she is undergoing MRI next week (25) for liver lesion to characterize this, hepatocellular adenoma (HCA) vs focal nodular hyperplasia (FNH). If lesion is HCA, patient will need to stop her COCP.   She is concerned about possibility of needing to stop her COCP due to dx of endometriosis. Symptoms are controlled on COCP but she does have history of very painful periods and pelvic pain 3/4 weeks of the month.   She has known dermoid cysts of right ovary,  "last pelvic US was 3/21/2023, will complete updated imaging at current time.     US pelvis, 3/21/2023:   \"FINDINGS:  UTERUS:  The uterus is anteverted in position, measuring 7.9 x 3.5 x 4.7 cm.   The uterus has a normal contour and echotexture.  The cervix appears within normal limits.     ENDOMETRIUM:    The endometrial echo complex has an AP caliber of 3.0 mm.  Its appearance is within normal limits for age and cycle and shows no filling defects..     OVARIES/ADNEXA:  Right ovary:  2.9 x 1.8 x 1.6 cm. 4.2 mL.  Left ovary:  3.1 x 1.4 x 1.3 cm. 3.0 mL.  Ovarian Doppler flow is within normal limits.  10 mm and 6 mm hyperechoic structures in the right ovary possibly small dermoids, previously measuring 4 mm and 8 mm.     OTHER:  No free fluid or loculated fluid collections.     IMPRESSION:  Interval removal of previously noted large left ovarian dermoid.  Small dermoids in the right ovarian similar in size compared to the prior exam. Follow-up pelvic ultrasound in 6-12 months.\"    Health Maintenance:    Exercise: frequently  Breast exams/breast awareness: yes    Past Medical History:   Diagnosis Date    Anxiety     History of transfusion     Age 4-as a child refused to eat,anemia    Irritable bowel syndrome     Migraine 01/01/2010    Have always had them but have increased in the last three mo    Migraines     Ovarian cyst     LEFT     Rash     Wears glasses      Past Surgical History:   Procedure Laterality Date    COLONOSCOPY      CYSTECTOMY      removed from ovary     PELVIC LAPAROSCOPY Left 6/13/2019    Procedure: LAPAROSCOPIC OVARIAN CYSTECTOMY;  Surgeon: Scarlett Soto DO;  [facility];  Service: Gynecology    UPPER GASTROINTESTINAL ENDOSCOPY      WISDOM TOOTH EXTRACTION       Past OB/Gyn History:   Patient's last menstrual period was 01/13/2025 (exact date).    Last Pap: 2/25/2021: no abnormalities  History of abnormal Pap smear: no  HPV vaccine completed: yes    Patient is currently sexually " active.   STD testing: no  Current contraception:cOCP (estrogen/progesterone)    Family History  Family History   Problem Relation Age of Onset    Asthma Mother     Breast cancer Mother     Hypertension Father     Hyperlipidemia Father     Irritable bowel syndrome Brother     Asthma Brother     Breast cancer Maternal Grandmother     No Known Problems Maternal Grandfather     Cancer Paternal Grandmother     Heart attack Paternal Grandfather      Family history of uterine or ovarian cancer: no  Family history of breast cancer: yes, MGM, mother  Family history of colon cancer: no    Social History:  Social History     Socioeconomic History    Marital status: Single     Spouse name: Not on file    Number of children: Not on file    Years of education: Not on file    Highest education level: Not on file   Occupational History     Employer: [employer]   Tobacco Use    Smoking status: Never     Passive exposure: Never    Smokeless tobacco: Never   Vaping Use    Vaping status: Never Used   Substance and Sexual Activity    Alcohol use: Yes     Alcohol/week: 3.0 standard drinks of alcohol     Types: 3 Glasses of wine per week     Comment: [facility]     Drug use: Yes     Types: Marijuana     Comment: Card     Sexual activity: Yes     Partners: Male     Birth control/protection: OCP   Other Topics Concern    Not on file   Social History Narrative    Brushes teeth daily; regular dental care    Lives with parents/grandparents    Sleeps 8-10 hours a day        Do you have pets? none Is pet allowed in bedroom? N/A    Are you a smoker? Never    Does anyone smoke in your home? No       Do you live with smokers? No    [place] frequently? No   How many times a year?      Social Drivers of Health     Financial Resource Strain: Not on file   Food Insecurity: Not on file   Transportation Needs: Not on file   Physical Activity: Not on file   Stress: Not on file   Social Connections: Not on file   Intimate Partner Violence: Not on  "file   Housing Stability: Not on file     Domestic violence screen: negative    Allergies:  Allergies   Allergen Reactions    Amoxicillin GI Intolerance       Medications:    Current Outpatient Medications:     albuterol (ProAir HFA) 90 mcg/act inhaler, Inhale 2 puffs every 4 (four) hours as needed for wheezing, Disp: 18 g, Rfl: 3    norethindrone-ethinyl estradiol-ferrous fumarate (Blisovi 24 Fe) 1-20 MG-MCG(24) per tablet, Take 1 tablet by mouth daily, Disp: 84 tablet, Rfl: 4    Probiotic Product (PROBIOTIC-10 PO), Take by mouth, Disp: , Rfl:     rizatriptan (MAXALT) 10 mg tablet, Take 1 tablet (10 mg total) by mouth once as needed for migraine for up to 1 dose May repeat in 2 hours if needed, Disp: 9 tablet, Rfl: 3    Review of Systems:  Review of Systems   Constitutional:  Negative for chills and fever.   Respiratory:  Negative for shortness of breath.    Cardiovascular:  Negative for chest pain.   Gastrointestinal:  Negative for abdominal pain, constipation and diarrhea.   Genitourinary:  Negative for dysuria, frequency, pelvic pain, vaginal discharge and vaginal pain.   Psychiatric/Behavioral:  Negative for self-injury and suicidal ideas.      Physical Exam:  /70 (BP Location: Right arm, Patient Position: Sitting, Cuff Size: Standard)   Ht 5' 2\" (1.575 m)   Wt 68 kg (150 lb)   LMP 01/13/2025 (Exact Date)   BMI 27.44 kg/m²    Physical Exam  Constitutional:       Appearance: Normal appearance.   Genitourinary:      Vulva and urethral meatus normal.      No labial fusion noted.      No vaginal erythema or tenderness.        Right Adnexa: not tender.     Left Adnexa: not tender.     No cervical discharge or friability.      Uterus is not tender.   Breasts:     Breasts are symmetrical.      Right: Normal.      Left: Normal.   HENT:      Head: Normocephalic and atraumatic.   Neck:      Thyroid: No thyroid mass or thyroid tenderness.   Cardiovascular:      Rate and Rhythm: Normal rate and regular rhythm.    "   Heart sounds: Normal heart sounds. No murmur heard.  Pulmonary:      Effort: Pulmonary effort is normal.      Breath sounds: Normal breath sounds. No wheezing.   Abdominal:      Palpations: Abdomen is soft. There is no mass.      Tenderness: There is no abdominal tenderness.   Lymphadenopathy:      Cervical: No cervical adenopathy.   Neurological:      General: No focal deficit present.      Mental Status: She is alert and oriented to person, place, and time.   Skin:     General: Skin is warm and dry.   Psychiatric:         Mood and Affect: Mood normal.         Behavior: Behavior normal.   Vitals and nursing note reviewed.

## 2025-02-12 ENCOUNTER — HOSPITAL ENCOUNTER (OUTPATIENT)
Dept: RADIOLOGY | Facility: HOSPITAL | Age: 30
Discharge: HOME | End: 2025-02-12
Attending: STUDENT IN AN ORGANIZED HEALTH CARE EDUCATION/TRAINING PROGRAM
Payer: COMMERCIAL

## 2025-02-12 ENCOUNTER — RESULTS FOLLOW-UP (OUTPATIENT)
Dept: OBGYN CLINIC | Facility: CLINIC | Age: 30
End: 2025-02-12

## 2025-02-12 DIAGNOSIS — K76.9 LIVER LESION: ICD-10-CM

## 2025-02-12 LAB
CYTOLOGIST CVX/VAG CYTO: NORMAL
DX ICD CODE: NORMAL
OTHER STN SPEC: NORMAL
OTHER STN SPEC: NORMAL
PATH REPORT.FINAL DX SPEC: NORMAL
SL AMB NOTE:: NORMAL
SL AMB SPECIMEN ADEQUACY: NORMAL
SL AMB TEST METHODOLOGY: NORMAL

## 2025-02-12 PROCEDURE — A9581 GADOXETATE DISODIUM INJ: HCPCS | Performed by: STUDENT IN AN ORGANIZED HEALTH CARE EDUCATION/TRAINING PROGRAM

## 2025-02-12 PROCEDURE — 74183 MRI ABD W/O CNTR FLWD CNTR: CPT

## 2025-02-12 RX ADMIN — GADOXETATE DISODIUM 10 ML: 181.43 INJECTION, SOLUTION INTRAVENOUS at 06:18

## 2025-02-16 ENCOUNTER — RESULTS FOLLOW-UP (OUTPATIENT)
Age: 30
End: 2025-02-16

## 2025-02-16 DIAGNOSIS — K76.9 LIVER LESION: Primary | ICD-10-CM

## 2025-02-16 NOTE — TELEPHONE ENCOUNTER
Reviewed MRI with the patient which showed an exophytic 5.4 cm lesion in the R hepatic lobe with hemorrhage. Does not appear to be an FNH. Ddx includes HCA but also fibrolamellar HCA. Will refer to IR for biopsy as well as surgical oncology as she will likely need resection. Would also recommend repeat labs with AFP levels as well.     She does have a history of endometriosis. Will review at tumor board to see if this is a possibility. All questions were answered to her satisfaction.    Michelle Stevens MD   [facility]  Gastroenterology and Hepatology

## 2025-02-17 ENCOUNTER — PREP FOR PROCEDURE (OUTPATIENT)
Dept: INTERVENTIONAL RADIOLOGY/VASCULAR | Facility: CLINIC | Age: 30
End: 2025-02-17

## 2025-02-17 ENCOUNTER — DOCUMENTATION (OUTPATIENT)
Dept: HEMATOLOGY ONCOLOGY | Facility: CLINIC | Age: 30
End: 2025-02-17

## 2025-02-17 DIAGNOSIS — R16.0 LIVER MASS: Primary | ICD-10-CM

## 2025-02-17 NOTE — PROGRESS NOTES
In-basket message received from Dr. Stevens to add patient to the liver MDCC on 2/28/2025. Chart reviewed and prep completed.

## 2025-02-19 ENCOUNTER — RESULTS FOLLOW-UP (OUTPATIENT)
Age: 30
End: 2025-02-19

## 2025-02-19 ENCOUNTER — APPOINTMENT (OUTPATIENT)
Dept: LAB | Facility: CLINIC | Age: 30
End: 2025-02-19
Payer: COMMERCIAL

## 2025-02-19 DIAGNOSIS — R79.89 INCREASED PTH LEVEL: ICD-10-CM

## 2025-02-19 DIAGNOSIS — E55.9 VITAMIN D INSUFFICIENCY: ICD-10-CM

## 2025-02-19 DIAGNOSIS — K76.9 LIVER LESION: ICD-10-CM

## 2025-02-19 LAB
25(OH)D3 SERPL-MCNC: 40.8 NG/ML (ref 30–100)
AFP-TM SERPL-MCNC: 1.76 NG/ML (ref 0–9)
ALBUMIN SERPL BCG-MCNC: 4.4 G/DL (ref 3.5–5)
ALP SERPL-CCNC: 85 U/L (ref 34–104)
ALT SERPL W P-5'-P-CCNC: 18 U/L (ref 7–52)
ANION GAP SERPL CALCULATED.3IONS-SCNC: 11 MMOL/L (ref 4–13)
AST SERPL W P-5'-P-CCNC: 26 U/L (ref 13–39)
BILIRUB SERPL-MCNC: 0.58 MG/DL (ref 0.2–1)
BUN SERPL-MCNC: 12 MG/DL (ref 5–25)
CA-I BLD-SCNC: 1.35 MMOL/L (ref 1.12–1.32)
CALCIUM SERPL-MCNC: 10.6 MG/DL (ref 8.4–10.2)
CHLORIDE SERPL-SCNC: 100 MMOL/L (ref 96–108)
CO2 SERPL-SCNC: 26 MMOL/L (ref 21–32)
CREAT SERPL-MCNC: 0.74 MG/DL (ref 0.6–1.3)
ERYTHROCYTE [DISTWIDTH] IN BLOOD BY AUTOMATED COUNT: 13.2 % (ref 11.6–15.1)
GFR SERPL CREATININE-BSD FRML MDRD: 110 ML/MIN/1.73SQ M
GLUCOSE P FAST SERPL-MCNC: 80 MG/DL (ref 65–99)
HCT VFR BLD AUTO: 46.9 % (ref 34.8–46.1)
HGB BLD-MCNC: 14.6 G/DL (ref 11.5–15.4)
INR PPP: 0.96 (ref 0.85–1.19)
MCH RBC QN AUTO: 30 PG (ref 26.8–34.3)
MCHC RBC AUTO-ENTMCNC: 31.1 G/DL (ref 31.4–37.4)
MCV RBC AUTO: 97 FL (ref 82–98)
PHOSPHATE SERPL-MCNC: 2.8 MG/DL (ref 2.7–4.5)
PLATELET # BLD AUTO: 275 THOUSANDS/UL (ref 149–390)
PMV BLD AUTO: 9.6 FL (ref 8.9–12.7)
POTASSIUM SERPL-SCNC: 3.8 MMOL/L (ref 3.5–5.3)
PROT SERPL-MCNC: 7.7 G/DL (ref 6.4–8.4)
PROTHROMBIN TIME: 13.1 SECONDS (ref 12.3–15)
PTH-INTACT SERPL-MCNC: 109.3 PG/ML (ref 12–88)
RBC # BLD AUTO: 4.86 MILLION/UL (ref 3.81–5.12)
SODIUM SERPL-SCNC: 137 MMOL/L (ref 135–147)
WBC # BLD AUTO: 5.7 THOUSAND/UL (ref 4.31–10.16)

## 2025-02-19 PROCEDURE — 82306 VITAMIN D 25 HYDROXY: CPT

## 2025-02-19 PROCEDURE — 82105 ALPHA-FETOPROTEIN SERUM: CPT

## 2025-02-19 PROCEDURE — 84100 ASSAY OF PHOSPHORUS: CPT

## 2025-02-19 PROCEDURE — 83970 ASSAY OF PARATHORMONE: CPT

## 2025-02-19 PROCEDURE — 80053 COMPREHEN METABOLIC PANEL: CPT

## 2025-02-19 PROCEDURE — 85027 COMPLETE CBC AUTOMATED: CPT

## 2025-02-19 PROCEDURE — 85610 PROTHROMBIN TIME: CPT

## 2025-02-19 PROCEDURE — 36415 COLL VENOUS BLD VENIPUNCTURE: CPT

## 2025-02-19 PROCEDURE — 82330 ASSAY OF CALCIUM: CPT

## 2025-02-28 ENCOUNTER — TELEPHONE (OUTPATIENT)
Dept: GASTROENTEROLOGY | Facility: CLINIC | Age: 30
End: 2025-02-28

## 2025-02-28 ENCOUNTER — DOCUMENTATION (OUTPATIENT)
Dept: HEMATOLOGY ONCOLOGY | Facility: CLINIC | Age: 30
End: 2025-02-28

## 2025-02-28 NOTE — PROGRESS NOTES
LIVER MULTIDISCIPLINARY CANCER CONFERENCE    DATE: 2/28/25      PRESENTING DOCTOR: Dr Stevens      DIAGNOSIS: Liver lesion      Marta Solorzano is a 29 y.o. was presented at the Liver Multidisciplinary Cancer Conference today.      PHYSICIAN RECOMMENDED PLAN:  -Recommend to follow with plan with [facility] for hepatectomy planned for 3/4/25.    Team agreed to plan.    The final treatment plan will be left to the discretion of the patient and the treating physician.     DISCLAIMERS:  TO THE TREATING PHYSICIAN:  This conference is a meeting of clinicians from various specialty areas who evaluate and discuss patients for whom a multidisciplinary treatment approach is being considered. Please note that the above opinion was a consensus of the conference attendees and is intended only to assist in quality care of the discussed patient.  The responsibility for follow up on the input given during the conference, along with any final decisions regarding plan of care, is that of the patient and the patient's provider. Accordingly, appointments have only been recommended based on this information and have NOT been scheduled unless otherwise noted.      TO THE PATIENT:  This summary is a brief record of major aspects of your cancer treatment. You may choose to share a copy with any of your doctors or nurses. However, this is not a detailed or comprehensive record of your care.      NCCN guidelines were readily available for review at this discussion

## 2025-03-04 ENCOUNTER — TELEPHONE (OUTPATIENT)
Dept: RADIOLOGY | Facility: HOSPITAL | Age: 30
End: 2025-03-04

## 2025-03-04 NOTE — TELEPHONE ENCOUNTER
We've tried calling multiple times to schedule liver bx, but have not heard back. In basket message sent to

## 2025-03-17 ENCOUNTER — RESULTS FOLLOW-UP (OUTPATIENT)
Dept: ENDOCRINOLOGY | Facility: CLINIC | Age: 30
End: 2025-03-17

## 2025-03-17 DIAGNOSIS — E21.3 HYPERPARATHYROIDISM (HCC): Primary | ICD-10-CM

## 2025-03-17 NOTE — PROGRESS NOTES
Reviewed repeat labs:     Component      Latest Ref Rng 2/19/2025   Sodium      135 - 147 mmol/L 137    Potassium      3.5 - 5.3 mmol/L 3.8    Chloride      96 - 108 mmol/L 100    Carbon Dioxide      21 - 32 mmol/L 26    ANION GAP      4 - 13 mmol/L 11    BUN      5 - 25 mg/dL 12    Creatinine      0.60 - 1.30 mg/dL 0.74    GLUCOSE, FASTING      65 - 99 mg/dL 80    Calcium      8.4 - 10.2 mg/dL 10.6 (H)    AST      13 - 39 U/L 26    ALT      7 - 52 U/L 18    ALK PHOS      34 - 104 U/L 85    Total Protein      6.4 - 8.4 g/dL 7.7    Albumin      3.5 - 5.0 g/dL 4.4    Total Bilirubin      0.20 - 1.00 mg/dL 0.58    GFR, Calculated      ml/min/1.73sq m 110    WBC      4.31 - 10.16 Thousand/uL 5.70    RBC      3.81 - 5.12 Million/uL 4.86    Hemoglobin      11.5 - 15.4 g/dL 14.6    Hematocrit      34.8 - 46.1 % 46.9 (H)    MCV      82 - 98 fL 97    MCH      26.8 - 34.3 pg 30.0    MCHC      31.4 - 37.4 g/dL 31.1 (L)    RDW      11.6 - 15.1 % 13.2    Platelet Count      149 - 390 Thousands/uL 275    MPV      8.9 - 12.7 fL 9.6    VITAMIN D, 25-HYDROXY      30.0 - 100.0 ng/mL 40.8    Calcium, Ionized      1.12 - 1.32 mmol/L 1.35 (H)    PTH      12.0 - 88.0 pg/mL 109.3 (H)    Phosphorus      2.7 - 4.5 mg/dL 2.8       This is suggestive of Primary Hyperparathyroidism.     Next step is to measure 24-hour urine collection for calcium and creatinine.   Orders have been placed and instructions have been sent to the patient.

## 2025-04-03 ENCOUNTER — OFFICE VISIT (OUTPATIENT)
Dept: FAMILY MEDICINE CLINIC | Facility: CLINIC | Age: 30
End: 2025-04-03
Payer: COMMERCIAL

## 2025-04-03 VITALS
OXYGEN SATURATION: 98 % | HEIGHT: 63 IN | BODY MASS INDEX: 25.69 KG/M2 | DIASTOLIC BLOOD PRESSURE: 70 MMHG | SYSTOLIC BLOOD PRESSURE: 118 MMHG | HEART RATE: 78 BPM | TEMPERATURE: 98.1 F | WEIGHT: 145 LBS | RESPIRATION RATE: 17 BRPM

## 2025-04-03 DIAGNOSIS — R16.0 LIVER MASS: ICD-10-CM

## 2025-04-03 DIAGNOSIS — E21.3 HYPERPARATHYROIDISM (HCC): ICD-10-CM

## 2025-04-03 DIAGNOSIS — Z00.00 ANNUAL PHYSICAL EXAM: Primary | ICD-10-CM

## 2025-04-03 PROCEDURE — 99395 PREV VISIT EST AGE 18-39: CPT | Performed by: FAMILY MEDICINE

## 2025-04-03 NOTE — ASSESSMENT & PLAN NOTE
She underwent lap partial right hepatectomy on 03/04/2025 with KALPESH mo with dr. Eliseo Michaels

## 2025-04-03 NOTE — PROGRESS NOTES
Adult Annual Physical  Name: Marta Solorzano      : 1995      MRN: [id number]  Encounter Provider: Kyra Dee MD  Encounter Date: 4/3/2025   Encounter department: [facility]    :  Assessment & Plan  Annual physical exam         Hyperparathyroidism (HCC)         Liver mass  She underwent lap partial right hepatectomy on 2025 with KALPESH gardiner with dr. Eliseo Michaels           Preventive Screenings:    - Cervical cancer screening: screening up-to-date     Immunizations:  - Immunizations due: Influenza      Depression Screening and Follow-up Plan: Patient was screened for depression during today's encounter. They screened negative with a PHQ-2 score of 0.          History of Present Illness     Adult Annual Physical:  Patient presents for annual physical.     Diet and Physical Activity:  - Diet/Nutrition: consuming 3-5 servings of fruits/vegetables daily.  - Exercise: moderate cardiovascular exercise and 3-4 times a week on average.    Depression Screening:  - PHQ-2 Score: 0    General Health:  - Sleep: sleeps well and 7-8 hours of sleep on average.  - Hearing: normal hearing bilateral ears.  - Vision: wears glasses.  - Dental: brushes teeth once daily and regular dental visits.    /GYN Health:  - Follows with GYN: yes.   - Menopause: premenopausal.   - History of STDs: no    Advanced Care Planning:  - Has an advanced directive?: no    - Has a durable medical POA?: no    - ACP document given to patient?: no      Review of Systems   Constitutional: Negative.    HENT: Negative.     Eyes: Negative.    Respiratory: Negative.     Cardiovascular: Negative.    Endocrine: Negative.    Genitourinary: Negative.    Musculoskeletal: Negative.    Allergic/Immunologic: Negative.    Neurological: Negative.    Hematological: Negative.    All other systems reviewed and are negative.    Medical History Reviewed by provider this encounter:  Tobacco  Allergies  Meds  Problems  Med Hx  Surg Hx   Fam Hx     .  Past Medical History   Past Medical History:   Diagnosis Date   • Anxiety    • History of transfusion     Age 4-as a child refused to eat,anemia   • Irritable bowel syndrome    • Migraine 01/01/2010    Have always had them but have increased in the last three mo   • Migraines    • Ovarian cyst     LEFT    • Rash    • Wears glasses      Past Surgical History:   Procedure Laterality Date   • COLONOSCOPY     • CYSTECTOMY      removed from ovary    • PELVIC LAPAROSCOPY Left 6/13/2019    Procedure: LAPAROSCOPIC OVARIAN CYSTECTOMY;  Surgeon: Scarlett Soto DO;  [facility];  Service: Gynecology   • UPPER GASTROINTESTINAL ENDOSCOPY     • WISDOM TOOTH EXTRACTION       Family History   Problem Relation Age of Onset   • Asthma Mother    • Breast cancer Mother    • Hypertension Father    • Hyperlipidemia Father    • Irritable bowel syndrome Brother    • Asthma Brother    • Breast cancer Maternal Grandmother    • No Known Problems Maternal Grandfather    • Cancer Paternal Grandmother    • Heart attack Paternal Grandfather       reports that she has never smoked. She has never been exposed to tobacco smoke. She has never used smokeless tobacco. She reports current alcohol use of about 3.0 standard drinks of alcohol per week. She reports current drug use. Drug: Marijuana.  Current Outpatient Medications   Medication Instructions   • albuterol (ProAir HFA) 90 mcg/act inhaler 2 puffs, Inhalation, Every 4 hours PRN   • Probiotic Product (PROBIOTIC-10 PO) Take by mouth   • rizatriptan (MAXALT) 10 mg, Oral, Once as needed, May repeat in 2 hours if needed     Allergies   Allergen Reactions   • Amoxicillin GI Intolerance      Current Outpatient Medications on File Prior to Visit   Medication Sig Dispense Refill   • albuterol (ProAir HFA) 90 mcg/act inhaler Inhale 2 puffs every 4 (four) hours as needed for wheezing 18 g 3   • Probiotic Product (PROBIOTIC-10 PO) Take by mouth     • rizatriptan (MAXALT) 10 mg  "tablet Take 1 tablet (10 mg total) by mouth once as needed for migraine for up to 1 dose May repeat in 2 hours if needed 9 tablet 3   • [DISCONTINUED] norethindrone-ethinyl estradiol-ferrous fumarate (Blisovi 24 Fe) 1-20 MG-MCG(24) per tablet Take 1 tablet by mouth daily 84 tablet 4     No current facility-administered medications on file prior to visit.      Social History     Tobacco Use   • Smoking status: Never     Passive exposure: Never   • Smokeless tobacco: Never   Vaping Use   • Vaping status: Never Used   Substance and Sexual Activity   • Alcohol use: Yes     Alcohol/week: 3.0 standard drinks of alcohol     Types: 3 Glasses of wine per week     Comment: [facility]    • Drug use: Yes     Types: Marijuana     Comment: Card    • Sexual activity: Yes     Partners: Male     Birth control/protection: OCP       Objective   /70 (BP Location: Left arm, Patient Position: Sitting, Cuff Size: Standard)   Pulse 78   Temp 98.1 °F (36.7 °C) (Tympanic)   Resp 17   Ht 5' 2.68\" (1.592 m)   Wt 65.8 kg (145 lb)   SpO2 98%   BMI 25.95 kg/m²     Physical Exam  Vitals and nursing note reviewed.   Constitutional:       Appearance: Normal appearance. She is well-developed.   HENT:      Head: Normocephalic and atraumatic.      Right Ear: Tympanic membrane normal.      Left Ear: Tympanic membrane normal.      Nose: Nose normal.      Mouth/Throat:      Mouth: Mucous membranes are moist.   Eyes:      Pupils: Pupils are equal, round, and reactive to light.   Neck:      Thyroid: No thyromegaly.   Cardiovascular:      Rate and Rhythm: Normal rate and regular rhythm.      Pulses: Normal pulses.      Heart sounds: Normal heart sounds. No murmur heard.  Pulmonary:      Effort: Pulmonary effort is normal.      Breath sounds: Normal breath sounds.   Abdominal:      General: Bowel sounds are normal.      Palpations: Abdomen is soft.   Musculoskeletal:         General: No deformity. Normal range of motion.      Cervical back: Normal " range of motion and neck supple.   Skin:     General: Skin is warm.      Findings: No erythema or rash.   Neurological:      General: No focal deficit present.      Mental Status: She is alert and oriented to person, place, and time.      Deep Tendon Reflexes: Reflexes are normal and symmetric.   Psychiatric:         Mood and Affect: Mood normal.         Behavior: Behavior normal.       .

## 2025-07-09 ENCOUNTER — TELEPHONE (OUTPATIENT)
Age: 30
End: 2025-07-09

## 2025-07-09 DIAGNOSIS — R53.82 CHRONIC FATIGUE: Primary | ICD-10-CM

## 2025-07-09 DIAGNOSIS — M25.50 ARTHRALGIA, UNSPECIFIED JOINT: ICD-10-CM

## 2025-07-09 NOTE — TELEPHONE ENCOUNTER
PT called in to schedule an appointment ,I advise  the patient she will need a referral. Pt will contact her PCP for a referral

## 2025-07-23 ENCOUNTER — APPOINTMENT (OUTPATIENT)
Dept: LAB | Facility: CLINIC | Age: 30
End: 2025-07-23
Payer: COMMERCIAL

## 2025-07-23 DIAGNOSIS — M25.50 ARTHRALGIA, UNSPECIFIED JOINT: ICD-10-CM

## 2025-07-23 DIAGNOSIS — R53.82 CHRONIC FATIGUE: ICD-10-CM

## 2025-07-23 LAB
ALBUMIN SERPL BCG-MCNC: 4.8 G/DL (ref 3.5–5)
ALP SERPL-CCNC: 84 U/L (ref 34–104)
ALT SERPL W P-5'-P-CCNC: 20 U/L (ref 7–52)
ANION GAP SERPL CALCULATED.3IONS-SCNC: 12 MMOL/L (ref 4–13)
AST SERPL W P-5'-P-CCNC: 26 U/L (ref 13–39)
BILIRUB SERPL-MCNC: 0.57 MG/DL (ref 0.2–1)
BUN SERPL-MCNC: 11 MG/DL (ref 5–25)
CALCIUM SERPL-MCNC: 10.4 MG/DL (ref 8.4–10.2)
CHLORIDE SERPL-SCNC: 103 MMOL/L (ref 96–108)
CO2 SERPL-SCNC: 24 MMOL/L (ref 21–32)
CREAT SERPL-MCNC: 0.66 MG/DL (ref 0.6–1.3)
CRP SERPL QL: 2.1 MG/L
ERYTHROCYTE [DISTWIDTH] IN BLOOD BY AUTOMATED COUNT: 13.4 % (ref 11.6–15.1)
ERYTHROCYTE [SEDIMENTATION RATE] IN BLOOD: 9 MM/HOUR (ref 0–19)
GFR SERPL CREATININE-BSD FRML MDRD: 120 ML/MIN/1.73SQ M
GLUCOSE P FAST SERPL-MCNC: 81 MG/DL (ref 65–99)
HCT VFR BLD AUTO: 45.1 % (ref 34.8–46.1)
HGB BLD-MCNC: 14.9 G/DL (ref 11.5–15.4)
MCH RBC QN AUTO: 30.7 PG (ref 26.8–34.3)
MCHC RBC AUTO-ENTMCNC: 33 G/DL (ref 31.4–37.4)
MCV RBC AUTO: 93 FL (ref 82–98)
PLATELET # BLD AUTO: 272 THOUSANDS/UL (ref 149–390)
PMV BLD AUTO: 9.7 FL (ref 8.9–12.7)
POTASSIUM SERPL-SCNC: 4.3 MMOL/L (ref 3.5–5.3)
PROT SERPL-MCNC: 7.8 G/DL (ref 6.4–8.4)
RBC # BLD AUTO: 4.85 MILLION/UL (ref 3.81–5.12)
RHEUMATOID FACT SERPL-ACNC: <10 IU/ML
SODIUM SERPL-SCNC: 139 MMOL/L (ref 135–147)
URATE SERPL-MCNC: 4 MG/DL (ref 2–7.5)
WBC # BLD AUTO: 5.35 THOUSAND/UL (ref 4.31–10.16)

## 2025-07-23 PROCEDURE — 86140 C-REACTIVE PROTEIN: CPT

## 2025-07-23 PROCEDURE — 36415 COLL VENOUS BLD VENIPUNCTURE: CPT

## 2025-07-23 PROCEDURE — 86235 NUCLEAR ANTIGEN ANTIBODY: CPT

## 2025-07-23 PROCEDURE — 86431 RHEUMATOID FACTOR QUANT: CPT

## 2025-07-23 PROCEDURE — 80053 COMPREHEN METABOLIC PANEL: CPT

## 2025-07-23 PROCEDURE — 86200 CCP ANTIBODY: CPT

## 2025-07-23 PROCEDURE — 84550 ASSAY OF BLOOD/URIC ACID: CPT

## 2025-07-23 PROCEDURE — 86618 LYME DISEASE ANTIBODY: CPT

## 2025-07-23 PROCEDURE — 85652 RBC SED RATE AUTOMATED: CPT

## 2025-07-23 PROCEDURE — 85027 COMPLETE CBC AUTOMATED: CPT

## 2025-07-24 LAB — B BURGDOR IGG+IGM SER QL IA: NEGATIVE

## 2025-07-25 ENCOUNTER — NURSE TRIAGE (OUTPATIENT)
Age: 30
End: 2025-07-25

## 2025-07-25 DIAGNOSIS — E21.3 HYPERPARATHYROIDISM (HCC): Primary | ICD-10-CM

## 2025-07-25 NOTE — TELEPHONE ENCOUNTER
Regarding: thyroid symptoms  ----- Message from Celia O sent at 7/25/2025  1:19 PM EDT -----  Patient is experiencing increased hair loss and other associated symptoms.     Please call patient. Next available appointment is not until October and she cannot wait that long to see a provider due to symptoms.

## 2025-07-25 NOTE — TELEPHONE ENCOUNTER
REASON FOR CONVERSATION: No chief complaint on file.    SYMPTOMS: hair loss, fatigue, muscle soreness, bruising easily, increase in thirst    OTHER HEALTH INFORMATION: patient said this has been ongoing for 2 months    PROTOCOL DISPOSITION: No disposition on file.    CARE ADVICE PROVIDED: -    PRACTICE FOLLOW-UP: patient requesting a sooner appt. Next available is October. Please contact with next steps and if sooner appt is appropriate.           Patient was called back and she stated that she has been experiencing hair loss, muscle soreness, fatigue, bruising easily, increase in thirst. She said it has been ongoing for the last 2 months. She is requesting a sooner appointment. Next available is not until October.

## 2025-07-29 ENCOUNTER — APPOINTMENT (OUTPATIENT)
Dept: LAB | Facility: CLINIC | Age: 30
End: 2025-07-29

## 2025-07-29 LAB
CCP AB SER IA-ACNC: 0.8 (ref ?–10)
ENA SS-A AB SER IA-ACNC: <0.5 U/ML (ref ?–10)
ENA SS-B IGG SER IA-ACNC: <0.6 U/ML (ref ?–10)

## 2025-07-30 ENCOUNTER — APPOINTMENT (OUTPATIENT)
Dept: LAB | Facility: CLINIC | Age: 30
End: 2025-07-30
Payer: COMMERCIAL

## 2025-07-30 DIAGNOSIS — E21.3 HYPERPARATHYROIDISM (HCC): ICD-10-CM

## 2025-07-30 LAB
ALBUMIN SERPL BCG-MCNC: 4.5 G/DL (ref 3.5–5)
ALP SERPL-CCNC: 70 U/L (ref 34–104)
ALT SERPL W P-5'-P-CCNC: 21 U/L (ref 7–52)
ANION GAP SERPL CALCULATED.3IONS-SCNC: 7 MMOL/L (ref 4–13)
AST SERPL W P-5'-P-CCNC: 22 U/L (ref 13–39)
BILIRUB SERPL-MCNC: 0.46 MG/DL (ref 0.2–1)
BUN SERPL-MCNC: 10 MG/DL (ref 5–25)
CALCIUM SERPL-MCNC: 10 MG/DL (ref 8.4–10.2)
CHLORIDE SERPL-SCNC: 105 MMOL/L (ref 96–108)
CO2 SERPL-SCNC: 26 MMOL/L (ref 21–32)
CREAT SERPL-MCNC: 0.73 MG/DL (ref 0.6–1.3)
GFR SERPL CREATININE-BSD FRML MDRD: 112 ML/MIN/1.73SQ M
GLUCOSE P FAST SERPL-MCNC: 77 MG/DL (ref 65–99)
POTASSIUM SERPL-SCNC: 4.2 MMOL/L (ref 3.5–5.3)
PROT SERPL-MCNC: 7.3 G/DL (ref 6.4–8.4)
SODIUM SERPL-SCNC: 138 MMOL/L (ref 135–147)

## 2025-07-30 PROCEDURE — 80053 COMPREHEN METABOLIC PANEL: CPT

## 2025-07-30 PROCEDURE — 82340 ASSAY OF CALCIUM IN URINE: CPT

## 2025-07-30 PROCEDURE — 36415 COLL VENOUS BLD VENIPUNCTURE: CPT

## 2025-07-30 PROCEDURE — 82570 ASSAY OF URINE CREATININE: CPT

## 2025-07-31 ENCOUNTER — OFFICE VISIT (OUTPATIENT)
Dept: ENDOCRINOLOGY | Facility: CLINIC | Age: 30
End: 2025-07-31
Payer: COMMERCIAL

## 2025-07-31 VITALS
HEART RATE: 65 BPM | OXYGEN SATURATION: 97 % | DIASTOLIC BLOOD PRESSURE: 72 MMHG | SYSTOLIC BLOOD PRESSURE: 122 MMHG | HEIGHT: 63 IN | WEIGHT: 145.2 LBS | BODY MASS INDEX: 25.73 KG/M2

## 2025-07-31 DIAGNOSIS — R53.83 OTHER FATIGUE: ICD-10-CM

## 2025-07-31 DIAGNOSIS — L65.9 HAIR LOSS: ICD-10-CM

## 2025-07-31 DIAGNOSIS — E21.3 HYPERPARATHYROIDISM (HCC): Primary | ICD-10-CM

## 2025-07-31 LAB
CALCIUM 24H UR-MCNC: 236.9 MG/24 HRS (ref 100–300)
CREAT 24H UR-MRATE: 1.3 G/24HR (ref 0.6–1.8)
SPECIMEN VOL UR: 2300 ML
SPECIMEN VOL UR: 2300 ML

## 2025-07-31 PROCEDURE — 99214 OFFICE O/P EST MOD 30 MIN: CPT | Performed by: INTERNAL MEDICINE

## (undated) DEVICE — TRAY FOLEY 16FR URIMETER SURESTEP

## (undated) DEVICE — SYRINGE 10ML LL

## (undated) DEVICE — ENSEAL LAPAROSCOPIC TISSUE SEALER G2 CURVED JAW FOR USE WITH G2 GENERATOR 5MM DIAMETER 35CM SHAFT LENGTH: Brand: ENSEAL

## (undated) DEVICE — ENDOPATH XCEL UNIVERSAL TROCAR STABLILITY SLEEVES: Brand: ENDOPATH XCEL

## (undated) DEVICE — TROCAR: Brand: KII FIOS FIRST ENTRY

## (undated) DEVICE — STERILE MINOR LAPAROSCOPY PACK: Brand: CARDINAL HEALTH

## (undated) DEVICE — GLOVE SRG BIOGEL 6.5

## (undated) DEVICE — TISSUE RETRIEVAL SYSTEM: Brand: INZII RETRIEVAL SYSTEM

## (undated) DEVICE — MAYO STAND COVER: Brand: CONVERTORS

## (undated) DEVICE — IRRIG ENDO FLO TUBING

## (undated) DEVICE — ADHESIVE SKN CLSR HISTOACRYL FLEX 0.5ML LF

## (undated) DEVICE — 3000CC GUARDIAN II: Brand: GUARDIAN

## (undated) DEVICE — INTENDED FOR TISSUE SEPARATION, AND OTHER PROCEDURES THAT REQUIRE A SHARP SURGICAL BLADE TO PUNCTURE OR CUT.: Brand: BARD-PARKER SAFETY BLADES SIZE 11, STERILE

## (undated) DEVICE — ANTI-FOG SOLUTION WITH FOAM PAD: Brand: DEVON

## (undated) DEVICE — CHLORAPREP HI-LITE 26ML ORANGE

## (undated) DEVICE — GLOVE INDICATOR PI UNDERGLOVE SZ 7 BLUE

## (undated) DEVICE — SCD SEQUENTIAL COMPRESSION COMFORT SLEEVE MEDIUM KNEE LENGTH: Brand: KENDALL SCD

## (undated) DEVICE — ENDOPATH XCEL BLADELESS TROCARS WITH STABILITY SLEEVES: Brand: ENDOPATH XCEL